# Patient Record
Sex: MALE | Race: WHITE | NOT HISPANIC OR LATINO | Employment: OTHER | URBAN - METROPOLITAN AREA
[De-identification: names, ages, dates, MRNs, and addresses within clinical notes are randomized per-mention and may not be internally consistent; named-entity substitution may affect disease eponyms.]

---

## 2023-03-08 ENCOUNTER — NURSING HOME VISIT (OUTPATIENT)
Dept: POST ACUTE CARE | Facility: EXTERNAL LOCATION | Age: 73
End: 2023-03-08
Payer: MEDICAID

## 2023-03-08 DIAGNOSIS — I10 HYPERTENSION, UNSPECIFIED TYPE: ICD-10-CM

## 2023-03-08 DIAGNOSIS — N40.1 BENIGN PROSTATIC HYPERPLASIA WITH URINARY RETENTION: ICD-10-CM

## 2023-03-08 DIAGNOSIS — F32.A DEPRESSION, UNSPECIFIED DEPRESSION TYPE: ICD-10-CM

## 2023-03-08 DIAGNOSIS — R33.8 BENIGN PROSTATIC HYPERPLASIA WITH URINARY RETENTION: ICD-10-CM

## 2023-03-08 PROCEDURE — 99309 SBSQ NF CARE MODERATE MDM 30: CPT | Performed by: INTERNAL MEDICINE

## 2023-03-08 NOTE — LETTER
Subjective  Chief complaint: Hernando Minor is a 72 y.o. male who is a long term resident patient being seen and evaluated for multiple medical problems.  Patient presents forGeneral medical care and follow-up    HPI:  Patient presents for general medical care and f/u.  Patient seen and examined at bedside.  No issues per nursing.  Patient has no acute complaints.   Denies chest pain and headache.  Denies feeling down and thoughts of harming self or others.        Review of Systems  All systems reviewed and negative except for what was mentioned in the HPI    Vital signs: 137/77, 97.6, 71, 18, 96%    Objective  Physical Exam  Constitutional:       General: He is not in acute distress.  Eyes:      Extraocular Movements: Extraocular movements intact.   Cardiovascular:      Rate and Rhythm: Regular rhythm.   Pulmonary:      Effort: Pulmonary effort is normal.      Breath sounds: Normal breath sounds.   Abdominal:      General: Bowel sounds are normal.      Palpations: Abdomen is soft.   Musculoskeletal:      Cervical back: Neck supple.      Right lower leg: No edema.      Left lower leg: No edema.   Neurological:      Mental Status: He is alert.   Psychiatric:         Mood and Affect: Mood normal.         Behavior: Behavior is cooperative.         Assessment/Plan  Problem List Items Addressed This Visit          Circulatory    HTN (hypertension)     BP at goal, continue anihypertensives  Controlled  Monitor BP            Genitourinary    BPH (benign prostatic hyperplasia)     Stable  ContinuTamsulosin            Other    Depression     Mood stable  Continue antidepressants          Medications, treatments, and labs reviewed  Continue medications and treatments as listed in [facility]    Scribe Attestation  By signing my name below, Treasure MARQUES Scribe   attest that this documentation has been prepared under the direction and in the presence of Papo Dupont MD.    Provider Attestation - Scribe documentation  All medical  record entries made by the Scribe were at my direction and personally dictated by me. I have reviewed the chart and agree that the record accurately reflects my personal performance of the history, physical exam, discussion and plan.

## 2023-03-09 PROBLEM — I10 HTN (HYPERTENSION): Status: ACTIVE | Noted: 2023-03-09

## 2023-03-09 PROBLEM — F32.A DEPRESSION: Status: ACTIVE | Noted: 2023-03-09

## 2023-03-09 PROBLEM — N40.0 BPH (BENIGN PROSTATIC HYPERPLASIA): Status: ACTIVE | Noted: 2023-03-09

## 2023-03-09 NOTE — PROGRESS NOTES
Subjective   Chief complaint: Hernando Minor is a 72 y.o. male who is a long term resident patient being seen and evaluated for multiple medical problems.  Patient presents forGeneral medical care and follow-up    HPI:  Patient presents for general medical care and f/u.  Patient seen and examined at bedside.  No issues per nursing.  Patient has no acute complaints.   Denies chest pain and headache.  Denies feeling down and thoughts of harming self or others.        Review of Systems  All systems reviewed and negative except for what was mentioned in the HPI    Vital signs: 137/77, 97.6, 71, 18, 96%    Objective   Physical Exam  Constitutional:       General: He is not in acute distress.  Eyes:      Extraocular Movements: Extraocular movements intact.   Cardiovascular:      Rate and Rhythm: Regular rhythm.   Pulmonary:      Effort: Pulmonary effort is normal.      Breath sounds: Normal breath sounds.   Abdominal:      General: Bowel sounds are normal.      Palpations: Abdomen is soft.   Musculoskeletal:      Cervical back: Neck supple.      Right lower leg: No edema.      Left lower leg: No edema.   Neurological:      Mental Status: He is alert.   Psychiatric:         Mood and Affect: Mood normal.         Behavior: Behavior is cooperative.         Assessment/Plan   Problem List Items Addressed This Visit          Circulatory    HTN (hypertension)     BP at goal, continue anihypertensives  Controlled  Monitor BP            Genitourinary    BPH (benign prostatic hyperplasia)     Stable  ContinuTamsulosin            Other    Depression     Mood stable  Continue antidepressants          Medications, treatments, and labs reviewed  Continue medications and treatments as listed in [facility]    Scribe Attestation  By signing my name below, ITreasure, Anna   attest that this documentation has been prepared under the direction and in the presence of Papo Dupont MD.    Provider Attestation - Scribe documentation  All  medical record entries made by the Scribe were at my direction and personally dictated by me. I have reviewed the chart and agree that the record accurately reflects my personal performance of the history, physical exam, discussion and plan.

## 2023-04-05 ENCOUNTER — NURSING HOME VISIT (OUTPATIENT)
Dept: POST ACUTE CARE | Facility: EXTERNAL LOCATION | Age: 73
End: 2023-04-05
Payer: MEDICAID

## 2023-04-05 DIAGNOSIS — N40.1 BENIGN PROSTATIC HYPERPLASIA WITH URINARY RETENTION: ICD-10-CM

## 2023-04-05 DIAGNOSIS — I10 HYPERTENSION, UNSPECIFIED TYPE: ICD-10-CM

## 2023-04-05 DIAGNOSIS — R33.8 BENIGN PROSTATIC HYPERPLASIA WITH URINARY RETENTION: ICD-10-CM

## 2023-04-05 DIAGNOSIS — F32.A DEPRESSION, UNSPECIFIED DEPRESSION TYPE: ICD-10-CM

## 2023-04-05 PROCEDURE — 99309 SBSQ NF CARE MODERATE MDM 30: CPT | Performed by: INTERNAL MEDICINE

## 2023-04-05 NOTE — LETTER
Patient: Hernando Minor  : 1950    Encounter Date: 2023    PROGRESS NOTE    Subjective  Chief complaint: Hernando Minor is a 72 y.o. male who is a long term care patient being seen and evaluated for monthly general medical care and follow-up.    HPI:    Patient presents for general medical care and f/u.  Patient seen and examined at bedside.  No issues per nursing.  Patient has no acute complaints.  HTN BP at goal.  Denies chest pain and headache.    BPH stable, denies difficulty voiding, urinary frequency, and weak stream.  Patient with diagnosis of depression.  Mood is stable.  Denies feeling down and thoughts of harming self or others. No acute distress.         Objective  Vital signs: 126/74, 98%    Physical Exam    Assessment/Plan  Problem List Items Addressed This Visit          Circulatory    HTN (hypertension)     BP at goal, continue anihypertensives  Controlled  Monitor BP            Genitourinary    BPH (benign prostatic hyperplasia)     Stable  ContinuTamsulosin            Other    Depression     Mood stable  Continue antidepressants          Medications, treatments, and labs reviewed  Continue medications and treatments as listed in [facility]    Scribe Attestation  By signing my name below, ITreasure Scribe   attest that this documentation has been prepared under the direction and in the presence of Papo Dupont MD.    Provider Attestation - Scribe documentation  All medical record entries made by the Scribe were at my direction and personally dictated by me. I have reviewed the chart and agree that the record accurately reflects my personal performance of the history, physical exam, discussion and plan.      Electronically Signed By: Papo Dupont MD   23  1:05 PM

## 2023-04-07 NOTE — PROGRESS NOTES
PROGRESS NOTE    Subjective   Chief complaint: Hernando Minor is a 72 y.o. male who is a long term care patient being seen and evaluated for monthly general medical care and follow-up.    HPI:    Patient presents for general medical care and f/u.  Patient seen and examined at bedside.  No issues per nursing.  Patient has no acute complaints.  HTN BP at goal.  Denies chest pain and headache.    BPH stable, denies difficulty voiding, urinary frequency, and weak stream.  Patient with diagnosis of depression.  Mood is stable.  Denies feeling down and thoughts of harming self or others. No acute distress.         Objective   Vital signs: 126/74, 98%    Physical Exam    Assessment/Plan   Problem List Items Addressed This Visit          Circulatory    HTN (hypertension)     BP at goal, continue anihypertensives  Controlled  Monitor BP            Genitourinary    BPH (benign prostatic hyperplasia)     Stable  ContinuTamsulosin            Other    Depression     Mood stable  Continue antidepressants          Medications, treatments, and labs reviewed  Continue medications and treatments as listed in [facility]    Scribe Attestation  By signing my name below, ITreasure Scribe   attest that this documentation has been prepared under the direction and in the presence of Papo Dupont MD.    Provider Attestation - Scribe documentation  All medical record entries made by the Scribe were at my direction and personally dictated by me. I have reviewed the chart and agree that the record accurately reflects my personal performance of the history, physical exam, discussion and plan.

## 2023-04-12 ENCOUNTER — NURSING HOME VISIT (OUTPATIENT)
Dept: POST ACUTE CARE | Facility: EXTERNAL LOCATION | Age: 73
End: 2023-04-12
Payer: MEDICAID

## 2023-04-12 DIAGNOSIS — I63.9 CEREBROVASCULAR ACCIDENT (CVA), UNSPECIFIED MECHANISM (MULTI): ICD-10-CM

## 2023-04-12 DIAGNOSIS — R53.1 WEAKNESS: ICD-10-CM

## 2023-04-12 PROCEDURE — 99308 SBSQ NF CARE LOW MDM 20: CPT | Performed by: INTERNAL MEDICINE

## 2023-04-12 NOTE — LETTER
Patient: Hernando Minor  : 1950    Encounter Date: 2023    PROGRESS NOTE    Subjective  Chief complaint: Hernando Minor is a 72 y.o. male who is a long term care patient being seen and evaluated for  weakness    HPI:  Patient with hx of CVA is working in therapy due to weakness. He requires SBA and minimal assistance for ADL's. No new issues or concerns at this time. No acute distress.       Objective  Vital signs:  127/76, 98%    Physical Exam  Constitutional:       General: He is not in acute distress.  Eyes:      Extraocular Movements: Extraocular movements intact.   Cardiovascular:      Rate and Rhythm: Normal rate and regular rhythm.   Pulmonary:      Effort: Pulmonary effort is normal.      Breath sounds: Normal breath sounds.   Abdominal:      General: Bowel sounds are normal.      Palpations: Abdomen is soft.   Musculoskeletal:         General: Normal range of motion.      Cervical back: Neck supple.      Right lower leg: No edema.      Left lower leg: No edema.   Neurological:      Mental Status: He is alert. Mental status is at baseline.   Psychiatric:         Mood and Affect: Mood normal.         Behavior: Behavior is cooperative.         Assessment/Plan  Problem List Items Addressed This Visit          Nervous    CVA (cerebral vascular accident) (CMS/HCC)     Monitor for weakness and changes in speech            Other    Weakness     Continue therapy          1. Weakness        2. Cerebrovascular accident (CVA), unspecified mechanism (CMS/HCC)           Medications, treatments, and labs reviewed  Continue medications and treatments as listed in [facility]    Scribe Attestation  By signing my name below, Treasure MARQUES Scribe   attest that this documentation has been prepared under the direction and in the presence of Papo Dupont MD.    Provider Attestation - Scribe documentation  All medical record entries made by the Scribe were at my direction and personally dictated by me. I have reviewed  the chart and agree that the record accurately reflects my personal performance of the history, physical exam, discussion and plan.      Electronically Signed By: Papo Dupont MD   4/17/23  3:59 PM

## 2023-04-17 PROBLEM — R53.1 WEAKNESS: Status: ACTIVE | Noted: 2023-04-17

## 2023-04-17 PROBLEM — I63.9 CVA (CEREBRAL VASCULAR ACCIDENT) (MULTI): Status: ACTIVE | Noted: 2023-04-17

## 2023-04-17 NOTE — PROGRESS NOTES
PROGRESS NOTE    Subjective   Chief complaint: Hernando Minor is a 72 y.o. male who is a long term care patient being seen and evaluated for  weakness    HPI:  Patient with hx of CVA is working in therapy due to weakness. He requires SBA and minimal assistance for ADL's. No new issues or concerns at this time. No acute distress.       Objective   Vital signs:  127/76, 98%    Physical Exam  Constitutional:       General: He is not in acute distress.  Eyes:      Extraocular Movements: Extraocular movements intact.   Cardiovascular:      Rate and Rhythm: Normal rate and regular rhythm.   Pulmonary:      Effort: Pulmonary effort is normal.      Breath sounds: Normal breath sounds.   Abdominal:      General: Bowel sounds are normal.      Palpations: Abdomen is soft.   Musculoskeletal:         General: Normal range of motion.      Cervical back: Neck supple.      Right lower leg: No edema.      Left lower leg: No edema.   Neurological:      Mental Status: He is alert. Mental status is at baseline.   Psychiatric:         Mood and Affect: Mood normal.         Behavior: Behavior is cooperative.         Assessment/Plan   Problem List Items Addressed This Visit          Nervous    CVA (cerebral vascular accident) (CMS/HCC)     Monitor for weakness and changes in speech            Other    Weakness     Continue therapy          1. Weakness        2. Cerebrovascular accident (CVA), unspecified mechanism (CMS/HCC)           Medications, treatments, and labs reviewed  Continue medications and treatments as listed in [facility]    Scribe Attestation  By signing my name below, I, Anna Mejia   attest that this documentation has been prepared under the direction and in the presence of Papo Dupont MD.    Provider Attestation - Scribe documentation  All medical record entries made by the Scribe were at my direction and personally dictated by me. I have reviewed the chart and agree that the record accurately reflects my personal  performance of the history, physical exam, discussion and plan.

## 2023-05-03 ENCOUNTER — NURSING HOME VISIT (OUTPATIENT)
Dept: POST ACUTE CARE | Facility: EXTERNAL LOCATION | Age: 73
End: 2023-05-03
Payer: MEDICAID

## 2023-05-03 DIAGNOSIS — N40.1 BENIGN PROSTATIC HYPERPLASIA WITH URINARY RETENTION: ICD-10-CM

## 2023-05-03 DIAGNOSIS — R33.8 BENIGN PROSTATIC HYPERPLASIA WITH URINARY RETENTION: ICD-10-CM

## 2023-05-03 DIAGNOSIS — I10 HYPERTENSION, UNSPECIFIED TYPE: ICD-10-CM

## 2023-05-03 DIAGNOSIS — F32.A DEPRESSION, UNSPECIFIED DEPRESSION TYPE: ICD-10-CM

## 2023-05-03 PROCEDURE — 99309 SBSQ NF CARE MODERATE MDM 30: CPT | Performed by: INTERNAL MEDICINE

## 2023-05-03 NOTE — LETTER
Patient: Hernando Minor  : 1950    Encounter Date: 2023    PROGRESS NOTE    Subjective  Chief complaint: Hernando Minor is a 72 y.o. male who is a long term care patient being seen and evaluated for monthly general medical care and follow-up.    HPI:    Patient presents for general medical care and f/u.  Patient seen and examined at bedside.  No issues per nursing.  Patient has no acute complaints.  HTN BP at goal.  Denies chest pain and headache.    BPH stable, denies difficulty voiding, urinary frequency, and weak stream.  Patient with diagnosis of depression.  Mood is stable.  Denies feeling down and thoughts of harming self or others. No acute distress.         Objective  Vital signs: 128/74, 97%    Physical Exam  Constitutional:       General: He is not in acute distress.  Eyes:      Extraocular Movements: Extraocular movements intact.   Cardiovascular:      Rate and Rhythm: Normal rate and regular rhythm.   Pulmonary:      Effort: Pulmonary effort is normal.      Breath sounds: Normal breath sounds.   Abdominal:      General: Bowel sounds are normal.      Palpations: Abdomen is soft.   Musculoskeletal:      Cervical back: Neck supple.      Right lower leg: No edema.      Left lower leg: No edema.   Neurological:      Mental Status: He is alert.   Psychiatric:         Mood and Affect: Mood normal.         Behavior: Behavior is cooperative.         Assessment/Plan  Problem List Items Addressed This Visit          Circulatory    HTN (hypertension)     BP at goal, continue anihypertensives  Controlled  Monitor BP            Genitourinary    BPH (benign prostatic hyperplasia)     Stable  ContinuTamsulosin            Other    Depression     Mood stable  Continue antidepressants        Medications, treatments, and labs reviewed  Continue medications and treatments as listed in [facility]    Scribe Attestation  By signing my name below, I, Treasure Byrnes, Gordoniblaura   attest that this documentation has been prepared  under the direction and in the presence of Papo Dupont MD.    Provider Attestation - Scribe documentation  All medical record entries made by the Scribe were at my direction and personally dictated by me. I have reviewed the chart and agree that the record accurately reflects my personal performance of the history, physical exam, discussion and plan.      Electronically Signed By: Papo Dupont MD   5/4/23  6:23 PM

## 2023-05-04 NOTE — PROGRESS NOTES
PROGRESS NOTE    Subjective   Chief complaint: Hernando Minor is a 72 y.o. male who is a long term care patient being seen and evaluated for monthly general medical care and follow-up.    HPI:    Patient presents for general medical care and f/u.  Patient seen and examined at bedside.  No issues per nursing.  Patient has no acute complaints.  HTN BP at goal.  Denies chest pain and headache.    BPH stable, denies difficulty voiding, urinary frequency, and weak stream.  Patient with diagnosis of depression.  Mood is stable.  Denies feeling down and thoughts of harming self or others. No acute distress.         Objective   Vital signs: 128/74, 97%    Physical Exam  Constitutional:       General: He is not in acute distress.  Eyes:      Extraocular Movements: Extraocular movements intact.   Cardiovascular:      Rate and Rhythm: Normal rate and regular rhythm.   Pulmonary:      Effort: Pulmonary effort is normal.      Breath sounds: Normal breath sounds.   Abdominal:      General: Bowel sounds are normal.      Palpations: Abdomen is soft.   Musculoskeletal:      Cervical back: Neck supple.      Right lower leg: No edema.      Left lower leg: No edema.   Neurological:      Mental Status: He is alert.   Psychiatric:         Mood and Affect: Mood normal.         Behavior: Behavior is cooperative.         Assessment/Plan   Problem List Items Addressed This Visit          Circulatory    HTN (hypertension)     BP at goal, continue anihypertensives  Controlled  Monitor BP            Genitourinary    BPH (benign prostatic hyperplasia)     Stable  ContinuTamsulosin            Other    Depression     Mood stable  Continue antidepressants        Medications, treatments, and labs reviewed  Continue medications and treatments as listed in [facility]    Scribe Attestation  By signing my name below, Treasure MARQUES, Scribe   attest that this documentation has been prepared under the direction and in the presence of Papo Dupont  MD.    Provider Attestation - Scribe documentation  All medical record entries made by the Scribe were at my direction and personally dictated by me. I have reviewed the chart and agree that the record accurately reflects my personal performance of the history, physical exam, discussion and plan.

## 2023-06-07 ENCOUNTER — NURSING HOME VISIT (OUTPATIENT)
Dept: POST ACUTE CARE | Facility: EXTERNAL LOCATION | Age: 73
End: 2023-06-07
Payer: MEDICAID

## 2023-06-07 DIAGNOSIS — F32.A DEPRESSION, UNSPECIFIED DEPRESSION TYPE: ICD-10-CM

## 2023-06-07 DIAGNOSIS — I10 HYPERTENSION, UNSPECIFIED TYPE: ICD-10-CM

## 2023-06-07 DIAGNOSIS — R33.8 BENIGN PROSTATIC HYPERPLASIA WITH URINARY RETENTION: ICD-10-CM

## 2023-06-07 DIAGNOSIS — N40.1 BENIGN PROSTATIC HYPERPLASIA WITH URINARY RETENTION: ICD-10-CM

## 2023-06-07 PROCEDURE — 99309 SBSQ NF CARE MODERATE MDM 30: CPT | Performed by: INTERNAL MEDICINE

## 2023-06-07 NOTE — LETTER
Patient: Hernando Minor  : 1950    Encounter Date: 2023    PROGRESS NOTE    Subjective  Chief complaint: Hernando Minor is a 72 y.o. male who is a long term care patient being seen and evaluated for monthly general medical care and follow-up.    HPI:    Patient presents for general medical care and f/u.  Patient seen and examined at bedside.  No issues per nursing.  Patient has no acute complaints.  HTN BP at goal.  Denies chest pain and headache.    BPH stable, denies difficulty voiding, urinary frequency, and weak stream.  Patient with diagnosis of depression.  Mood is stable.  Denies feeling down and thoughts of harming self or others. No acute distress.         Objective  Vital signs: 130/74, 97%    Physical Exam  Constitutional:       General: He is not in acute distress.  Eyes:      Extraocular Movements: Extraocular movements intact.   Cardiovascular:      Rate and Rhythm: Normal rate and regular rhythm.   Pulmonary:      Effort: Pulmonary effort is normal.      Breath sounds: Normal breath sounds.   Abdominal:      General: Bowel sounds are normal.      Palpations: Abdomen is soft.   Musculoskeletal:      Cervical back: Neck supple.      Right lower leg: No edema.      Left lower leg: No edema.   Neurological:      Mental Status: He is alert.   Psychiatric:         Mood and Affect: Mood normal.         Behavior: Behavior is cooperative.         Assessment/Plan  Problem List Items Addressed This Visit          Circulatory    HTN (hypertension)     BP at goal, continue anihypertensives  Controlled  Monitor BP            Genitourinary    BPH (benign prostatic hyperplasia)     Stable  Continue Tamsulosin            Other    Depression     Mood stable  Continue antidepressants        Medications, treatments, and labs reviewed  Continue medications and treatments as listed in [facility]    Scribe Attestation  By signing my name below, I, Treasure Byrnes, Anna   attest that this documentation has been prepared  under the direction and in the presence of Papo Dupont MD.    Provider Attestation - Scribe documentation  All medical record entries made by the Scribe were at my direction and personally dictated by me. I have reviewed the chart and agree that the record accurately reflects my personal performance of the history, physical exam, discussion and plan.      Electronically Signed By: Papo Dupont MD   6/10/23  2:17 PM

## 2023-06-09 NOTE — PROGRESS NOTES
PROGRESS NOTE    Subjective   Chief complaint: Hernando Minor is a 72 y.o. male who is a long term care patient being seen and evaluated for monthly general medical care and follow-up.    HPI:    Patient presents for general medical care and f/u.  Patient seen and examined at bedside.  No issues per nursing.  Patient has no acute complaints.  HTN BP at goal.  Denies chest pain and headache.    BPH stable, denies difficulty voiding, urinary frequency, and weak stream.  Patient with diagnosis of depression.  Mood is stable.  Denies feeling down and thoughts of harming self or others. No acute distress.         Objective   Vital signs: 130/74, 97%    Physical Exam  Constitutional:       General: He is not in acute distress.  Eyes:      Extraocular Movements: Extraocular movements intact.   Cardiovascular:      Rate and Rhythm: Normal rate and regular rhythm.   Pulmonary:      Effort: Pulmonary effort is normal.      Breath sounds: Normal breath sounds.   Abdominal:      General: Bowel sounds are normal.      Palpations: Abdomen is soft.   Musculoskeletal:      Cervical back: Neck supple.      Right lower leg: No edema.      Left lower leg: No edema.   Neurological:      Mental Status: He is alert.   Psychiatric:         Mood and Affect: Mood normal.         Behavior: Behavior is cooperative.         Assessment/Plan   Problem List Items Addressed This Visit          Circulatory    HTN (hypertension)     BP at goal, continue anihypertensives  Controlled  Monitor BP            Genitourinary    BPH (benign prostatic hyperplasia)     Stable  Continue Tamsulosin            Other    Depression     Mood stable  Continue antidepressants        Medications, treatments, and labs reviewed  Continue medications and treatments as listed in [facility]    Scribe Attestation  By signing my name below, Treasure MARQUES, Scribe   attest that this documentation has been prepared under the direction and in the presence of Papo Dupont  MD.    Provider Attestation - Scribe documentation  All medical record entries made by the Scribe were at my direction and personally dictated by me. I have reviewed the chart and agree that the record accurately reflects my personal performance of the history, physical exam, discussion and plan.

## 2023-06-14 ENCOUNTER — NURSING HOME VISIT (OUTPATIENT)
Dept: POST ACUTE CARE | Facility: EXTERNAL LOCATION | Age: 73
End: 2023-06-14
Payer: MEDICAID

## 2023-06-14 DIAGNOSIS — F32.A DEPRESSION, UNSPECIFIED DEPRESSION TYPE: ICD-10-CM

## 2023-06-14 PROCEDURE — 99308 SBSQ NF CARE LOW MDM 20: CPT | Performed by: INTERNAL MEDICINE

## 2023-06-14 NOTE — LETTER
Patient: Hernando Minor  : 1950    Encounter Date: 2023    PROGRESS NOTE    Subjective  Chief complaint: Hernando Minor is a 72 y.o. male who is a long term care patient being seen and evaluated for depression    HPI:  I was asked to see patient and evaluate continued use of Zoloft. Patient with hx of depression, denies feeling down or thoughts of harming self or others. Patient fell down landed on the metal part of the wheelchair complaint of back pain x-ray was negative she was given pain medication and she felt better.       Objective  Vital signs: 129/74, 98%    Physical Exam  Constitutional:       General: He is not in acute distress.  Eyes:      Extraocular Movements: Extraocular movements intact.   Cardiovascular:      Rate and Rhythm: Normal rate and regular rhythm.   Pulmonary:      Effort: Pulmonary effort is normal.      Breath sounds: Normal breath sounds.   Abdominal:      General: Bowel sounds are normal.      Palpations: Abdomen is soft.   Musculoskeletal:      Cervical back: Neck supple.      Right lower leg: Edema present.      Left lower leg: Edema present.      Comments: Tender lower back   Neurological:      Mental Status: He is alert.   Psychiatric:         Mood and Affect: Mood normal.         Behavior: Behavior is cooperative.         Assessment/Plan  Problem List Items Addressed This Visit          Other    Depression     Mood stable  Decrease Zoloft to 100mg at HS for gradual dose reduction trail  Continue to monitor mood and behaviors          Other Visit Diagnoses       Fall, initial encounter    -  Primary    Acute low back pain with sciatica, sciatica laterality unspecified, unspecified back pain laterality              Medications, treatments, and labs reviewed  Continue medications and treatments as listed in [facility]    Scribe Attestation  By signing my name below, Treasure MARQUES, Anna   attest that this documentation has been prepared under the direction and in the presence  of Papo Dupont MD.    Provider Attestation - Scribe documentation  All medical record entries made by the Scribe were at my direction and personally dictated by me. I have reviewed the chart and agree that the record accurately reflects my personal performance of the history, physical exam, discussion and plan.    1. Fall, initial encounter        2. Depression, unspecified depression type        3. Acute low back pain with sciatica, sciatica laterality unspecified, unspecified back pain laterality               Electronically Signed By: Papo Dupont MD   6/16/23  4:54 PM

## 2023-06-16 NOTE — PROGRESS NOTES
PROGRESS NOTE    Subjective   Chief complaint: Hernando Minor is a 72 y.o. male who is a long term care patient being seen and evaluated for depression    HPI:  I was asked to see patient and evaluate continued use of Zoloft. Patient with hx of depression, denies feeling down or thoughts of harming self or others. No acute distress.       Objective   Vital signs: 129/74, 98%    Physical Exam  Constitutional:       General: He is not in acute distress.  Eyes:      Extraocular Movements: Extraocular movements intact.   Cardiovascular:      Rate and Rhythm: Normal rate and regular rhythm.   Pulmonary:      Effort: Pulmonary effort is normal.      Breath sounds: Normal breath sounds.   Abdominal:      General: Bowel sounds are normal.      Palpations: Abdomen is soft.   Musculoskeletal:      Cervical back: Neck supple.      Right lower leg: Edema present.      Left lower leg: Edema present.   Neurological:      Mental Status: He is alert.   Psychiatric:         Mood and Affect: Mood normal.         Behavior: Behavior is cooperative.         Assessment/Plan   Problem List Items Addressed This Visit          Other    Depression     Mood stable  Decrease Zoloft to 100mg at HS for gradual dose reduction trail  Continue to monitor mood and behaviors          Medications, treatments, and labs reviewed  Continue medications and treatments as listed in [facility]    Scribe Attestation  By signing my name below, ITreasure Scribe   attest that this documentation has been prepared under the direction and in the presence of Papo Dupont MD.    Provider Attestation - Scribe documentation  All medical record entries made by the Scribe were at my direction and personally dictated by me. I have reviewed the chart and agree that the record accurately reflects my personal performance of the history, physical exam, discussion and plan.    1. Depression, unspecified depression type

## 2023-06-16 NOTE — ASSESSMENT & PLAN NOTE
Mood stable  Decrease Zoloft to 100mg at HS for gradual dose reduction trail  Continue to monitor mood and behaviors

## 2023-07-05 ENCOUNTER — NURSING HOME VISIT (OUTPATIENT)
Dept: POST ACUTE CARE | Facility: EXTERNAL LOCATION | Age: 73
End: 2023-07-05
Payer: MEDICAID

## 2023-07-05 DIAGNOSIS — I10 HYPERTENSION, UNSPECIFIED TYPE: ICD-10-CM

## 2023-07-05 DIAGNOSIS — R33.8 BENIGN PROSTATIC HYPERPLASIA WITH URINARY RETENTION: ICD-10-CM

## 2023-07-05 DIAGNOSIS — F32.A DEPRESSION, UNSPECIFIED DEPRESSION TYPE: ICD-10-CM

## 2023-07-05 DIAGNOSIS — N40.1 BENIGN PROSTATIC HYPERPLASIA WITH URINARY RETENTION: ICD-10-CM

## 2023-07-05 PROCEDURE — 99309 SBSQ NF CARE MODERATE MDM 30: CPT | Performed by: INTERNAL MEDICINE

## 2023-07-05 NOTE — PROGRESS NOTES
PROGRESS NOTE    Subjective   Chief complaint: Hernando Minor is a 72 y.o. male who is a long term care patient being seen and evaluated for monthly general medical care and follow-up.    HPI:    Patient presents for general medical care and f/u.  Patient seen and examined at bedside.  No issues per nursing.  Patient has no acute complaints.  HTN BP at goal.  Denies chest pain and headache.    BPH stable, denies difficulty voiding, urinary frequency, and weak stream.  Patient with diagnosis of depression.  Mood is stable.  Denies feeling down and thoughts of harming self or others. No acute distress.         Objective   Vital signs: 128/76, 97%    Physical Exam  Constitutional:       General: He is not in acute distress.  Eyes:      Extraocular Movements: Extraocular movements intact.   Cardiovascular:      Rate and Rhythm: Normal rate and regular rhythm.   Pulmonary:      Effort: Pulmonary effort is normal.      Breath sounds: Normal breath sounds.   Abdominal:      General: Bowel sounds are normal.      Palpations: Abdomen is soft.   Musculoskeletal:      Cervical back: Neck supple.      Right lower leg: No edema.      Left lower leg: No edema.   Neurological:      Mental Status: He is alert.   Psychiatric:         Mood and Affect: Mood normal.         Behavior: Behavior is cooperative.         Assessment/Plan   Problem List Items Addressed This Visit          Cardiac and Vasculature    HTN (hypertension)     BP at goal, continue anihypertensives  Controlled  Monitor BP            Genitourinary and Reproductive    BPH (benign prostatic hyperplasia)     Stable  Continue Tamsulosin            Mental Health    Depression     Mood stable  Continue to monitor mood and behaviors        Medications, treatments, and labs reviewed  Continue medications and treatments as listed in [facility]    Scribe Attestation  By signing my name below, ITreasure, Anna   attest that this documentation has been prepared under the  direction and in the presence of Papo Dupont MD.    Provider Attestation - Scribe documentation  All medical record entries made by the Scribe were at my direction and personally dictated by me. I have reviewed the chart and agree that the record accurately reflects my personal performance of the history, physical exam, discussion and plan.  1. Benign prostatic hyperplasia with urinary retention        2. Depression, unspecified depression type        3. Hypertension, unspecified type

## 2023-07-05 NOTE — LETTER
Patient: Hernando Minor  : 1950    Encounter Date: 2023    PROGRESS NOTE    Subjective  Chief complaint: Hernando Minor is a 72 y.o. male who is a long term care patient being seen and evaluated for monthly general medical care and follow-up.    HPI:    Patient presents for general medical care and f/u.  Patient seen and examined at bedside.  No issues per nursing.  Patient has no acute complaints.  HTN BP at goal.  Denies chest pain and headache.    BPH stable, denies difficulty voiding, urinary frequency, and weak stream.  Patient with diagnosis of depression.  Mood is stable.  Denies feeling down and thoughts of harming self or others. No acute distress.         Objective  Vital signs: 128/76, 97%    Physical Exam  Constitutional:       General: He is not in acute distress.  Eyes:      Extraocular Movements: Extraocular movements intact.   Cardiovascular:      Rate and Rhythm: Normal rate and regular rhythm.   Pulmonary:      Effort: Pulmonary effort is normal.      Breath sounds: Normal breath sounds.   Abdominal:      General: Bowel sounds are normal.      Palpations: Abdomen is soft.   Musculoskeletal:      Cervical back: Neck supple.      Right lower leg: No edema.      Left lower leg: No edema.   Neurological:      Mental Status: He is alert.   Psychiatric:         Mood and Affect: Mood normal.         Behavior: Behavior is cooperative.         Assessment/Plan  Problem List Items Addressed This Visit          Cardiac and Vasculature    HTN (hypertension)     BP at goal, continue anihypertensives  Controlled  Monitor BP            Genitourinary and Reproductive    BPH (benign prostatic hyperplasia)     Stable  Continue Tamsulosin            Mental Health    Depression     Mood stable  Continue to monitor mood and behaviors        Medications, treatments, and labs reviewed  Continue medications and treatments as listed in [facility]    Scribe Attestation  By signing my name below, I, Anna Mejia    attest that this documentation has been prepared under the direction and in the presence of Papo Dupont MD.    Provider Attestation - Scribe documentation  All medical record entries made by the Scribe were at my direction and personally dictated by me. I have reviewed the chart and agree that the record accurately reflects my personal performance of the history, physical exam, discussion and plan.  1. Benign prostatic hyperplasia with urinary retention        2. Depression, unspecified depression type        3. Hypertension, unspecified type              Electronically Signed By: Papo Dupont MD   7/5/23  6:07 PM

## 2023-08-02 ENCOUNTER — NURSING HOME VISIT (OUTPATIENT)
Dept: POST ACUTE CARE | Facility: EXTERNAL LOCATION | Age: 73
End: 2023-08-02
Payer: MEDICAID

## 2023-08-02 DIAGNOSIS — R33.8 BENIGN PROSTATIC HYPERPLASIA WITH URINARY RETENTION: Primary | ICD-10-CM

## 2023-08-02 DIAGNOSIS — N40.1 BENIGN PROSTATIC HYPERPLASIA WITH URINARY RETENTION: Primary | ICD-10-CM

## 2023-08-02 DIAGNOSIS — F32.A DEPRESSION, UNSPECIFIED DEPRESSION TYPE: ICD-10-CM

## 2023-08-02 DIAGNOSIS — I10 HYPERTENSION, UNSPECIFIED TYPE: ICD-10-CM

## 2023-08-02 PROCEDURE — 99309 SBSQ NF CARE MODERATE MDM 30: CPT | Performed by: INTERNAL MEDICINE

## 2023-08-02 NOTE — LETTER
Patient: Hernando iMnor  : 1950    Encounter Date: 2023    PROGRESS NOTE    Subjective  Chief complaint: Hernando Minor is a 72 y.o. male who is a long term care patient being seen and evaluated for monthly general medical care and follow-up.    HPI:    Patient presents for general medical care and f/u.  Patient seen and examined at bedside.  No issues per nursing.  Patient has no acute complaints.  HTN BP at goal.  Denies chest pain and headache.    BPH stable, denies difficulty voiding, urinary frequency, and weak stream.  Patient with diagnosis of depression.  Mood is stable.  Denies feeling down and thoughts of harming self or others. No acute distress.         Objective  Vital signs: 128/76, 97%    Physical Exam  Constitutional:       General: He is not in acute distress.  Eyes:      Extraocular Movements: Extraocular movements intact.   Cardiovascular:      Rate and Rhythm: Normal rate and regular rhythm.   Pulmonary:      Effort: Pulmonary effort is normal.      Breath sounds: Normal breath sounds.   Abdominal:      General: Bowel sounds are normal.      Palpations: Abdomen is soft.   Musculoskeletal:      Cervical back: Neck supple.      Right lower leg: No edema.      Left lower leg: No edema.   Neurological:      Mental Status: He is alert.   Psychiatric:         Mood and Affect: Mood normal.         Behavior: Behavior is cooperative.         Assessment/Plan  Problem List Items Addressed This Visit       HTN (hypertension)     BP at goal, continue anihypertensives  Controlled  Monitor BP         Depression     Mood stable  Continue to monitor mood and behaviors         BPH (benign prostatic hyperplasia) - Primary     Stable  Continue Tamsulosin        Medications, treatments, and labs reviewed  Continue medications and treatments as listed in [facility]    Scribe Attestation  By signing my name below, I, Treasure Byrnes, Anna   attest that this documentation has been prepared under the direction and in  the presence of Papo Dupont MD.    Provider Attestation - Scribe documentation  All medical record entries made by the Scribe were at my direction and personally dictated by me. I have reviewed the chart and agree that the record accurately reflects my personal performance of the history, physical exam, discussion and plan.  1. Benign prostatic hyperplasia with urinary retention        2. Depression, unspecified depression type        3. Hypertension, unspecified type              Electronically Signed By: Papo Dupont MD   8/3/23 11:45 AM

## 2023-08-03 NOTE — PROGRESS NOTES
PROGRESS NOTE    Subjective   Chief complaint: Hernando Minor is a 72 y.o. male who is a long term care patient being seen and evaluated for monthly general medical care and follow-up.    HPI:    Patient presents for general medical care and f/u.  Patient seen and examined at bedside.  No issues per nursing.  Patient has no acute complaints.  HTN BP at goal.  Denies chest pain and headache.    BPH stable, denies difficulty voiding, urinary frequency, and weak stream.  Patient with diagnosis of depression.  Mood is stable.  Denies feeling down and thoughts of harming self or others. No acute distress.         Objective   Vital signs: 128/76, 97%    Physical Exam  Constitutional:       General: He is not in acute distress.  Eyes:      Extraocular Movements: Extraocular movements intact.   Cardiovascular:      Rate and Rhythm: Normal rate and regular rhythm.   Pulmonary:      Effort: Pulmonary effort is normal.      Breath sounds: Normal breath sounds.   Abdominal:      General: Bowel sounds are normal.      Palpations: Abdomen is soft.   Musculoskeletal:      Cervical back: Neck supple.      Right lower leg: No edema.      Left lower leg: No edema.   Neurological:      Mental Status: He is alert.   Psychiatric:         Mood and Affect: Mood normal.         Behavior: Behavior is cooperative.         Assessment/Plan   Problem List Items Addressed This Visit       HTN (hypertension)     BP at goal, continue anihypertensives  Controlled  Monitor BP         Depression     Mood stable  Continue to monitor mood and behaviors         BPH (benign prostatic hyperplasia) - Primary     Stable  Continue Tamsulosin        Medications, treatments, and labs reviewed  Continue medications and treatments as listed in [facility]    Scribe Attestation  By signing my name below, Treasure MARQUES Scribe   attest that this documentation has been prepared under the direction and in the presence of Papo Dupont MD.    Provider Attestation - Anna  documentation  All medical record entries made by the Scribe were at my direction and personally dictated by me. I have reviewed the chart and agree that the record accurately reflects my personal performance of the history, physical exam, discussion and plan.  1. Benign prostatic hyperplasia with urinary retention        2. Depression, unspecified depression type        3. Hypertension, unspecified type

## 2023-09-06 ENCOUNTER — NURSING HOME VISIT (OUTPATIENT)
Dept: POST ACUTE CARE | Facility: EXTERNAL LOCATION | Age: 73
End: 2023-09-06
Payer: MEDICAID

## 2023-09-06 DIAGNOSIS — I10 HYPERTENSION, UNSPECIFIED TYPE: Primary | ICD-10-CM

## 2023-09-06 DIAGNOSIS — N40.1 BENIGN PROSTATIC HYPERPLASIA WITH URINARY RETENTION: ICD-10-CM

## 2023-09-06 DIAGNOSIS — R33.8 BENIGN PROSTATIC HYPERPLASIA WITH URINARY RETENTION: ICD-10-CM

## 2023-09-06 DIAGNOSIS — F32.A DEPRESSION, UNSPECIFIED DEPRESSION TYPE: ICD-10-CM

## 2023-09-06 PROCEDURE — 99309 SBSQ NF CARE MODERATE MDM 30: CPT | Performed by: INTERNAL MEDICINE

## 2023-09-06 NOTE — LETTER
Patient: Hernando Minor  : 1950    Encounter Date: 2023    PROGRESS NOTE    Subjective  Chief complaint: Hernando Minor is a 72 y.o. male who is a long term care patient being seen and evaluated for monthly general medical care and follow-up.    HPI:    Patient presents for general medical care and f/u.  Patient seen and examined at bedside.  No issues per nursing.  Patient has no acute complaints.  HTN BP at goal.  Denies chest pain and headache.    BPH stable, denies difficulty voiding, urinary frequency, and weak stream.  Patient with diagnosis of depression.  Mood is stable.  Denies feeling down and thoughts of harming self or others. No acute distress.         Objective  Vital signs: 129/74, 97%    Physical Exam  Constitutional:       General: He is not in acute distress.  Eyes:      Extraocular Movements: Extraocular movements intact.   Cardiovascular:      Rate and Rhythm: Normal rate and regular rhythm.   Pulmonary:      Effort: Pulmonary effort is normal.      Breath sounds: Normal breath sounds.   Abdominal:      General: Bowel sounds are normal.      Palpations: Abdomen is soft.   Musculoskeletal:      Cervical back: Neck supple.      Right lower leg: No edema.      Left lower leg: No edema.   Neurological:      Mental Status: He is alert.   Psychiatric:         Mood and Affect: Mood normal.         Behavior: Behavior is cooperative.         Assessment/Plan  Problem List Items Addressed This Visit       HTN (hypertension) - Primary     BP at goal, continue anihypertensives  Controlled  Monitor BP         Depression     Mood stable  Continue to monitor mood and behaviors         BPH (benign prostatic hyperplasia)     Stable  Continue Tamsulosin        Medications, treatments, and labs reviewed  Continue medications and treatments as listed in [facility]    Scribe Attestation  By signing my name below, I, Treasure Byrnes, Anna   attest that this documentation has been prepared under the direction and in  the presence of Papo Dupont MD.    Provider Attestation - Scribe documentation  All medical record entries made by the Scribe were at my direction and personally dictated by me. I have reviewed the chart and agree that the record accurately reflects my personal performance of the history, physical exam, discussion and plan.  1. Hypertension, unspecified type        2. Depression, unspecified depression type        3. Benign prostatic hyperplasia with urinary retention              Electronically Signed By: Papo Dupont MD   9/7/23  3:23 PM

## 2023-09-07 NOTE — PROGRESS NOTES
PROGRESS NOTE    Subjective   Chief complaint: Hernando Minor is a 72 y.o. male who is a long term care patient being seen and evaluated for monthly general medical care and follow-up.    HPI:    Patient presents for general medical care and f/u.  Patient seen and examined at bedside.  No issues per nursing.  Patient has no acute complaints.  HTN BP at goal.  Denies chest pain and headache.    BPH stable, denies difficulty voiding, urinary frequency, and weak stream.  Patient with diagnosis of depression.  Mood is stable.  Denies feeling down and thoughts of harming self or others. No acute distress.         Objective   Vital signs: 129/74, 97%    Physical Exam  Constitutional:       General: He is not in acute distress.  Eyes:      Extraocular Movements: Extraocular movements intact.   Cardiovascular:      Rate and Rhythm: Normal rate and regular rhythm.   Pulmonary:      Effort: Pulmonary effort is normal.      Breath sounds: Normal breath sounds.   Abdominal:      General: Bowel sounds are normal.      Palpations: Abdomen is soft.   Musculoskeletal:      Cervical back: Neck supple.      Right lower leg: No edema.      Left lower leg: No edema.   Neurological:      Mental Status: He is alert.   Psychiatric:         Mood and Affect: Mood normal.         Behavior: Behavior is cooperative.         Assessment/Plan   Problem List Items Addressed This Visit       HTN (hypertension) - Primary     BP at goal, continue anihypertensives  Controlled  Monitor BP         Depression     Mood stable  Continue to monitor mood and behaviors         BPH (benign prostatic hyperplasia)     Stable  Continue Tamsulosin        Medications, treatments, and labs reviewed  Continue medications and treatments as listed in [facility]    Scribe Attestation  By signing my name below, Treasure MARQUES Scribe   attest that this documentation has been prepared under the direction and in the presence of Papo Dupont MD.    Provider Attestation - Gordonibe  documentation  All medical record entries made by the Scribe were at my direction and personally dictated by me. I have reviewed the chart and agree that the record accurately reflects my personal performance of the history, physical exam, discussion and plan.  1. Hypertension, unspecified type        2. Depression, unspecified depression type        3. Benign prostatic hyperplasia with urinary retention

## 2023-10-04 ENCOUNTER — NURSING HOME VISIT (OUTPATIENT)
Dept: POST ACUTE CARE | Facility: EXTERNAL LOCATION | Age: 73
End: 2023-10-04
Payer: MEDICAID

## 2023-10-04 DIAGNOSIS — N40.1 BENIGN PROSTATIC HYPERPLASIA WITH URINARY RETENTION: ICD-10-CM

## 2023-10-04 DIAGNOSIS — I10 HYPERTENSION, UNSPECIFIED TYPE: Primary | ICD-10-CM

## 2023-10-04 DIAGNOSIS — F32.A DEPRESSION, UNSPECIFIED DEPRESSION TYPE: ICD-10-CM

## 2023-10-04 DIAGNOSIS — R33.8 BENIGN PROSTATIC HYPERPLASIA WITH URINARY RETENTION: ICD-10-CM

## 2023-10-04 PROCEDURE — 99309 SBSQ NF CARE MODERATE MDM 30: CPT | Performed by: INTERNAL MEDICINE

## 2023-10-04 NOTE — LETTER
Patient: Hernando Minor  : 1950    Encounter Date: 10/04/2023    PROGRESS NOTE    Subjective  Chief complaint: Hernando Minor is a 73 y.o. male who is a long term care patient being seen and evaluated for monthly general medical care and follow-up.    HPI:    Patient presents for general medical care and f/u.  Patient seen and examined at bedside.  No issues per nursing.  Patient has no acute complaints.  HTN BP at goal.  Denies chest pain and headache.    BPH stable, denies difficulty voiding, urinary frequency, and weak stream.  Patient with diagnosis of depression.  Mood is stable.  Denies feeling down and thoughts of harming self or others. No acute distress.         Objective  Vital signs: 124/72, 97%    Physical Exam  Constitutional:       General: He is not in acute distress.  Eyes:      Extraocular Movements: Extraocular movements intact.   Cardiovascular:      Rate and Rhythm: Normal rate and regular rhythm.   Pulmonary:      Effort: Pulmonary effort is normal.      Breath sounds: Normal breath sounds.   Abdominal:      General: Bowel sounds are normal.      Palpations: Abdomen is soft.   Musculoskeletal:      Cervical back: Neck supple.      Right lower leg: No edema.      Left lower leg: No edema.   Neurological:      Mental Status: He is alert.   Psychiatric:         Mood and Affect: Mood normal.         Behavior: Behavior is cooperative.         Assessment/Plan  Problem List Items Addressed This Visit       HTN (hypertension) - Primary     BP at goal, continue anihypertensives  Controlled  Monitor BP         Depression     Mood stable  Continue to monitor mood and behaviors         BPH (benign prostatic hyperplasia)     Stable  Continue Tamsulosin        Medications, treatments, and labs reviewed  Continue medications and treatments as listed in [facility]    Scribe Attestation  By signing my name below, I, Treasure Byrnes, Anna   attest that this documentation has been prepared under the direction and in  the presence of Papo Dupont MD.    Provider Attestation - Scribe documentation  All medical record entries made by the Scribe were at my direction and personally dictated by me. I have reviewed the chart and agree that the record accurately reflects my personal performance of the history, physical exam, discussion and plan.  1. Hypertension, unspecified type        2. Benign prostatic hyperplasia with urinary retention        3. Depression, unspecified depression type              Electronically Signed By: Papo Dupont MD   10/4/23  3:32 PM

## 2023-10-04 NOTE — PROGRESS NOTES
PROGRESS NOTE    Subjective   Chief complaint: Heranndo Minor is a 73 y.o. male who is a long term care patient being seen and evaluated for monthly general medical care and follow-up.    HPI:    Patient presents for general medical care and f/u.  Patient seen and examined at bedside.  No issues per nursing.  Patient has no acute complaints.  HTN BP at goal.  Denies chest pain and headache.    BPH stable, denies difficulty voiding, urinary frequency, and weak stream.  Patient with diagnosis of depression.  Mood is stable.  Denies feeling down and thoughts of harming self or others. No acute distress.         Objective   Vital signs: 124/72, 97%    Physical Exam  Constitutional:       General: He is not in acute distress.  Eyes:      Extraocular Movements: Extraocular movements intact.   Cardiovascular:      Rate and Rhythm: Normal rate and regular rhythm.   Pulmonary:      Effort: Pulmonary effort is normal.      Breath sounds: Normal breath sounds.   Abdominal:      General: Bowel sounds are normal.      Palpations: Abdomen is soft.   Musculoskeletal:      Cervical back: Neck supple.      Right lower leg: No edema.      Left lower leg: No edema.   Neurological:      Mental Status: He is alert.   Psychiatric:         Mood and Affect: Mood normal.         Behavior: Behavior is cooperative.         Assessment/Plan   Problem List Items Addressed This Visit       HTN (hypertension) - Primary     BP at goal, continue anihypertensives  Controlled  Monitor BP         Depression     Mood stable  Continue to monitor mood and behaviors         BPH (benign prostatic hyperplasia)     Stable  Continue Tamsulosin        Medications, treatments, and labs reviewed  Continue medications and treatments as listed in [facility]    Scribe Attestation  By signing my name below, Treasure MARQUES Scribe   attest that this documentation has been prepared under the direction and in the presence of Papo Dupont MD.    Provider Attestation - Gordonibe  documentation  All medical record entries made by the Scribe were at my direction and personally dictated by me. I have reviewed the chart and agree that the record accurately reflects my personal performance of the history, physical exam, discussion and plan.  1. Hypertension, unspecified type        2. Benign prostatic hyperplasia with urinary retention        3. Depression, unspecified depression type

## 2023-11-01 ENCOUNTER — NURSING HOME VISIT (OUTPATIENT)
Dept: POST ACUTE CARE | Facility: EXTERNAL LOCATION | Age: 73
End: 2023-11-01
Payer: MEDICAID

## 2023-11-01 DIAGNOSIS — F32.A DEPRESSION, UNSPECIFIED DEPRESSION TYPE: ICD-10-CM

## 2023-11-01 DIAGNOSIS — R33.8 BENIGN PROSTATIC HYPERPLASIA WITH URINARY RETENTION: ICD-10-CM

## 2023-11-01 DIAGNOSIS — N40.1 BENIGN PROSTATIC HYPERPLASIA WITH URINARY RETENTION: ICD-10-CM

## 2023-11-01 DIAGNOSIS — I10 HYPERTENSION, UNSPECIFIED TYPE: Primary | ICD-10-CM

## 2023-11-01 DIAGNOSIS — I63.9 CEREBROVASCULAR ACCIDENT (CVA), UNSPECIFIED MECHANISM (MULTI): ICD-10-CM

## 2023-11-01 PROCEDURE — 99309 SBSQ NF CARE MODERATE MDM 30: CPT | Performed by: INTERNAL MEDICINE

## 2023-11-01 NOTE — LETTER
Patient: Hernando Minor  : 1950    Encounter Date: 2023    PROGRESS NOTE    Subjective  Chief complaint: Hernando Minor is a 73 y.o. male who is a long term care patient being seen and evaluated for monthly general medical care and follow-up.    HPI:    Patient with hx oc CVA presents for general medical care and f/u.  Patient seen and examined at bedside.  No issues per nursing.  Patient has no acute complaints.  HTN BP at goal.  Denies chest pain and headache.    BPH stable, denies difficulty voiding, urinary frequency, and weak stream.  Patient with diagnosis of depression.  Mood is stable.  Denies feeling down and thoughts of harming self or others. Denies changes in speech or increased weakness. No acute distress.         Objective  Vital signs: 129/74, 97%    Physical Exam  Constitutional:       General: He is not in acute distress.  Eyes:      Extraocular Movements: Extraocular movements intact.   Cardiovascular:      Rate and Rhythm: Normal rate and regular rhythm.   Pulmonary:      Effort: Pulmonary effort is normal.      Breath sounds: Normal breath sounds.   Abdominal:      General: Bowel sounds are normal.      Palpations: Abdomen is soft.   Musculoskeletal:      Cervical back: Neck supple.      Right lower leg: No edema.      Left lower leg: No edema.   Neurological:      Mental Status: He is alert.   Psychiatric:         Mood and Affect: Mood normal.         Behavior: Behavior is cooperative.         Assessment/Plan  Problem List Items Addressed This Visit       HTN (hypertension) - Primary     BP at goal, continue anihypertensives  Controlled  Monitor BP         Depression     Mood stable  Continue to monitor mood and behaviors         BPH (benign prostatic hyperplasia)     Stable  Continue Tamsulosin  Monitor for symptoms         CVA (cerebral vascular accident) (CMS/[facility])     Monitor for weakness and changes in speech        Medications, treatments, and labs reviewed  Continue medications  and treatments as listed in [facility]    Scribe Attestation  By signing my name below, I, Anna Mejia   attest that this documentation has been prepared under the direction and in the presence of Papo Dupont MD.    Provider Attestation - Scribe documentation  All medical record entries made by the Scribe were at my direction and personally dictated by me. I have reviewed the chart and agree that the record accurately reflects my personal performance of the history, physical exam, discussion and plan.  1. Hypertension, unspecified type        2. Depression, unspecified depression type        3. Cerebrovascular accident (CVA), unspecified mechanism (CMS/HCC)        4. Benign prostatic hyperplasia with urinary retention              Electronically Signed By: Papo Dupont MD   11/1/23  3:42 PM

## 2023-11-01 NOTE — PROGRESS NOTES
PROGRESS NOTE    Subjective   Chief complaint: Hernando Minor is a 73 y.o. male who is a long term care patient being seen and evaluated for monthly general medical care and follow-up.    HPI:    Patient with hx oc CVA presents for general medical care and f/u.  Patient seen and examined at bedside.  No issues per nursing.  Patient has no acute complaints.  HTN BP at goal.  Denies chest pain and headache.    BPH stable, denies difficulty voiding, urinary frequency, and weak stream.  Patient with diagnosis of depression.  Mood is stable.  Denies feeling down and thoughts of harming self or others. Denies changes in speech or increased weakness. No acute distress.         Objective   Vital signs: 129/74, 97%    Physical Exam  Constitutional:       General: He is not in acute distress.  Eyes:      Extraocular Movements: Extraocular movements intact.   Cardiovascular:      Rate and Rhythm: Normal rate and regular rhythm.   Pulmonary:      Effort: Pulmonary effort is normal.      Breath sounds: Normal breath sounds.   Abdominal:      General: Bowel sounds are normal.      Palpations: Abdomen is soft.   Musculoskeletal:      Cervical back: Neck supple.      Right lower leg: No edema.      Left lower leg: No edema.   Neurological:      Mental Status: He is alert.   Psychiatric:         Mood and Affect: Mood normal.         Behavior: Behavior is cooperative.         Assessment/Plan   Problem List Items Addressed This Visit       HTN (hypertension) - Primary     BP at goal, continue anihypertensives  Controlled  Monitor BP         Depression     Mood stable  Continue to monitor mood and behaviors         BPH (benign prostatic hyperplasia)     Stable  Continue Tamsulosin  Monitor for symptoms         CVA (cerebral vascular accident) (CMS/[facility])     Monitor for weakness and changes in speech        Medications, treatments, and labs reviewed  Continue medications and treatments as listed in [facility]    Scribe Attestation  By signing my  name below, I, Anna Mejia   attest that this documentation has been prepared under the direction and in the presence of Papo Dupont MD.    Provider Attestation - Scribe documentation  All medical record entries made by the Scribe were at my direction and personally dictated by me. I have reviewed the chart and agree that the record accurately reflects my personal performance of the history, physical exam, discussion and plan.  1. Hypertension, unspecified type        2. Depression, unspecified depression type        3. Cerebrovascular accident (CVA), unspecified mechanism (CMS/HCC)        4. Benign prostatic hyperplasia with urinary retention

## 2023-11-20 ENCOUNTER — NURSING HOME VISIT (OUTPATIENT)
Dept: POST ACUTE CARE | Facility: EXTERNAL LOCATION | Age: 73
End: 2023-11-20
Payer: MEDICAID

## 2023-11-20 DIAGNOSIS — K59.01 SLOW TRANSIT CONSTIPATION: Primary | ICD-10-CM

## 2023-11-20 DIAGNOSIS — I10 HYPERTENSION, UNSPECIFIED TYPE: ICD-10-CM

## 2023-11-20 PROCEDURE — 99308 SBSQ NF CARE LOW MDM 20: CPT | Performed by: NURSE PRACTITIONER

## 2023-11-20 NOTE — LETTER
Patient: Hernando Minor  : 1950    Encounter Date: 2023    PROGRESS NOTE    Subjective  Chief complaint: Hernando Minor is a 73 y.o. male who is a long term care patient being seen and evaluated for reported abdominal pain     HPI: Nursing reports that he had complained of abdominal discomfort.  KUB done - negative. Is currently in bed. Cooperative. Denies any discomfort.   HPI      Objective  Vital signs: 132/66-82-18-97.7    Physical Exam  Constitutional:       General: He is not in acute distress.  Eyes:      Extraocular Movements: Extraocular movements intact.   Cardiovascular:      Rate and Rhythm: Normal rate and regular rhythm.   Pulmonary:      Effort: Pulmonary effort is normal.      Breath sounds: Normal breath sounds.   Abdominal:      General: Bowel sounds are normal.      Palpations: Abdomen is soft.      Comments: Abdomen is soft   BS active x 4     Musculoskeletal:      Cervical back: Neck supple.      Right lower leg: No edema.      Left lower leg: No edema.   Neurological:      Mental Status: He is alert.   Psychiatric:         Mood and Affect: Mood normal.         Behavior: Behavior is cooperative.         Assessment/Plan  Problem List Items Addressed This Visit       HTN (hypertension)     BP at goal, continue anihypertensives  Controlled  Monitor BP         Constipation - Primary     Reported abdominal pain  Has not had recent BM   KuB done   Started miralax  Advised to increase fluids           Medications, treatments, and labs reviewed  Continue medications and treatments as listed in EMR    NIKKI Cruz      Electronically Signed By: NIKKI Cruz   23  1:23 PM

## 2023-11-22 ENCOUNTER — NURSING HOME VISIT (OUTPATIENT)
Dept: POST ACUTE CARE | Facility: EXTERNAL LOCATION | Age: 73
End: 2023-11-22
Payer: MEDICAID

## 2023-11-22 DIAGNOSIS — I63.9 CEREBROVASCULAR ACCIDENT (CVA), UNSPECIFIED MECHANISM (MULTI): Primary | ICD-10-CM

## 2023-11-22 DIAGNOSIS — R19.7 DIARRHEA, UNSPECIFIED TYPE: ICD-10-CM

## 2023-11-22 PROCEDURE — 99308 SBSQ NF CARE LOW MDM 20: CPT | Performed by: INTERNAL MEDICINE

## 2023-11-22 NOTE — PROGRESS NOTES
PROGRESS NOTE    Subjective   Chief complaint: Hernando Minor is a 73 y.o. male who is a long term care patient being seen and evaluated for loose stool    HPI:  Patient with hx of CVA, presented with complaints of loose stools and was started on Mylanta. He is feeling better. Denies ABD pain, gas, nausea or vomiting. Denies increased weakness or changes in speech. No other concerns at this time. No acute distress.       Objective   Vital signs: 126/74, 98%    Physical Exam  Constitutional:       General: He is not in acute distress.     Appearance: He is obese.   Eyes:      Extraocular Movements: Extraocular movements intact.   Cardiovascular:      Rate and Rhythm: Normal rate and regular rhythm.   Pulmonary:      Effort: Pulmonary effort is normal.      Breath sounds: Normal breath sounds.   Abdominal:      General: Bowel sounds are normal.      Palpations: Abdomen is soft.   Musculoskeletal:         General: Normal range of motion.      Cervical back: Neck supple.      Right lower leg: No edema.      Left lower leg: No edema.   Skin:     General: Skin is warm and dry.   Neurological:      General: No focal deficit present.      Mental Status: He is alert and oriented to person, place, and time. Mental status is at baseline.   Psychiatric:         Mood and Affect: Mood normal.         Behavior: Behavior normal. Behavior is cooperative.         Thought Content: Thought content normal.         Assessment/Plan   Problem List Items Addressed This Visit       CVA (cerebral vascular accident) (CMS/[facility]) - Primary     Monitor for weakness and changes in speech         Diarrhea     Improving with current meds  monitor          Medications, treatments, and labs reviewed  Continue medications and treatments as listed in [facility]    Scribe Attestation  By signing my name below, Treasure MARQUES, Scribe   attest that this documentation has been prepared under the direction and in the presence of Papo Dupont MD.    Provider  Attestation - Scribe documentation  All medical record entries made by the Scribe were at my direction and personally dictated by me. I have reviewed the chart and agree that the record accurately reflects my personal performance of the history, physical exam, discussion and plan.    1. Cerebrovascular accident (CVA), unspecified mechanism (CMS/HCC)        2. Diarrhea, unspecified type

## 2023-11-22 NOTE — LETTER
Patient: Hernando Minor  : 1950    Encounter Date: 2023    PROGRESS NOTE    Subjective  Chief complaint: Hernando Minor is a 73 y.o. male who is a long term care patient being seen and evaluated for loose stool    HPI:  Patient with hx of CVA, presented with complaints of loose stools and was started on Mylanta. He is feeling better. Denies ABD pain, gas, nausea or vomiting. Denies increased weakness or changes in speech. No other concerns at this time. No acute distress.       Objective  Vital signs: 126/74, 98%    Physical Exam  Constitutional:       General: He is not in acute distress.     Appearance: He is obese.   Eyes:      Extraocular Movements: Extraocular movements intact.   Cardiovascular:      Rate and Rhythm: Normal rate and regular rhythm.   Pulmonary:      Effort: Pulmonary effort is normal.      Breath sounds: Normal breath sounds.   Abdominal:      General: Bowel sounds are normal.      Palpations: Abdomen is soft.   Musculoskeletal:         General: Normal range of motion.      Cervical back: Neck supple.      Right lower leg: No edema.      Left lower leg: No edema.   Skin:     General: Skin is warm and dry.   Neurological:      General: No focal deficit present.      Mental Status: He is alert and oriented to person, place, and time. Mental status is at baseline.   Psychiatric:         Mood and Affect: Mood normal.         Behavior: Behavior normal. Behavior is cooperative.         Thought Content: Thought content normal.         Assessment/Plan  Problem List Items Addressed This Visit       CVA (cerebral vascular accident) (CMS/[facility]) - Primary     Monitor for weakness and changes in speech         Diarrhea     Improving with current meds  monitor          Medications, treatments, and labs reviewed  Continue medications and treatments as listed in [facility]    Scribe Attestation  By signing my name below, Treasure MARQUES, Scribe   attest that this documentation has been prepared under the  direction and in the presence of Papo Dupont MD.    Provider Attestation - Scribe documentation  All medical record entries made by the Scribe were at my direction and personally dictated by me. I have reviewed the chart and agree that the record accurately reflects my personal performance of the history, physical exam, discussion and plan.    1. Cerebrovascular accident (CVA), unspecified mechanism (CMS/HCC)        2. Diarrhea, unspecified type               Electronically Signed By: Papo Dupont MD   11/22/23  6:04 PM

## 2023-11-23 PROBLEM — K59.00 CONSTIPATION: Status: ACTIVE | Noted: 2023-11-23

## 2023-11-23 NOTE — ASSESSMENT & PLAN NOTE
Reported abdominal pain  Has not had recent BM   KuB done   Started miralax  Advised to increase fluids

## 2023-11-23 NOTE — PROGRESS NOTES
PROGRESS NOTE    Subjective   Chief complaint: Hernando Minor is a 73 y.o. male who is a long term care patient being seen and evaluated for reported abdominal pain     HPI: Nursing reports that he had complained of abdominal discomfort.  KUB done - negative. Is currently in bed. Cooperative. Denies any discomfort.   HPI      Objective   Vital signs: 132/66-82-18-97.7    Physical Exam  Constitutional:       General: He is not in acute distress.  Eyes:      Extraocular Movements: Extraocular movements intact.   Cardiovascular:      Rate and Rhythm: Normal rate and regular rhythm.   Pulmonary:      Effort: Pulmonary effort is normal.      Breath sounds: Normal breath sounds.   Abdominal:      General: Bowel sounds are normal.      Palpations: Abdomen is soft.      Comments: Abdomen is soft   BS active x 4     Musculoskeletal:      Cervical back: Neck supple.      Right lower leg: No edema.      Left lower leg: No edema.   Neurological:      Mental Status: He is alert.   Psychiatric:         Mood and Affect: Mood normal.         Behavior: Behavior is cooperative.         Assessment/Plan   Problem List Items Addressed This Visit       HTN (hypertension)     BP at goal, continue anihypertensives  Controlled  Monitor BP         Constipation - Primary     Reported abdominal pain  Has not had recent BM   KuB done   Started miralax  Advised to increase fluids           Medications, treatments, and labs reviewed  Continue medications and treatments as listed in EMR    Jolanta Rainey, APRN-CNP

## 2023-12-06 ENCOUNTER — NURSING HOME VISIT (OUTPATIENT)
Dept: POST ACUTE CARE | Facility: EXTERNAL LOCATION | Age: 73
End: 2023-12-06
Payer: MEDICAID

## 2023-12-06 DIAGNOSIS — I10 HYPERTENSION, UNSPECIFIED TYPE: Primary | ICD-10-CM

## 2023-12-06 DIAGNOSIS — R33.8 BENIGN PROSTATIC HYPERPLASIA WITH URINARY RETENTION: ICD-10-CM

## 2023-12-06 DIAGNOSIS — N40.1 BENIGN PROSTATIC HYPERPLASIA WITH URINARY RETENTION: ICD-10-CM

## 2023-12-06 DIAGNOSIS — F32.A DEPRESSION, UNSPECIFIED DEPRESSION TYPE: ICD-10-CM

## 2023-12-06 PROCEDURE — 99309 SBSQ NF CARE MODERATE MDM 30: CPT | Performed by: INTERNAL MEDICINE

## 2023-12-06 NOTE — PROGRESS NOTES
PROGRESS NOTE    Subjective   Chief complaint: Hernando Minor is a 73 y.o. male who is a long term care patient being seen and evaluated for monthly general medical care and follow-up.    HPI:    Patient presents for general medical care and f/u.  Patient seen and examined at bedside.  No issues per nursing.  Patient has no acute complaints.  HTN BP at goal.  Denies chest pain and headache.    BPH stable, denies difficulty voiding, urinary frequency, and weak stream.  Patient with diagnosis of depression.  Mood is stable.  Denies feeling down and thoughts of harming self or others. No acute distress.         Objective   Vital signs: 124/72, 97%    Physical Exam  Constitutional:       General: He is not in acute distress.  Eyes:      Extraocular Movements: Extraocular movements intact.   Cardiovascular:      Rate and Rhythm: Normal rate and regular rhythm.   Pulmonary:      Effort: Pulmonary effort is normal.      Breath sounds: Normal breath sounds.   Abdominal:      General: Bowel sounds are normal.      Palpations: Abdomen is soft.   Musculoskeletal:      Cervical back: Neck supple.      Right lower leg: No edema.      Left lower leg: No edema.   Neurological:      Mental Status: He is alert.   Psychiatric:         Mood and Affect: Mood normal.         Behavior: Behavior is cooperative.         Assessment/Plan   Problem List Items Addressed This Visit       HTN (hypertension) - Primary     BP at goal, continue anihypertensives  Controlled  Monitor BP         Depression     Mood stable  Continue to monitor mood and behaviors         BPH (benign prostatic hyperplasia)     Stable  Continue Tamsulosin  Monitor for symptoms        Medications, treatments, and labs reviewed  Continue medications and treatments as listed in [facility]    Scribe Attestation  By signing my name below, Treasure MARQUES, Scriblaura   attest that this documentation has been prepared under the direction and in the presence of Papo Dupont  MD.    Provider Attestation - Scribe documentation  All medical record entries made by the Scribe were at my direction and personally dictated by me. I have reviewed the chart and agree that the record accurately reflects my personal performance of the history, physical exam, discussion and plan.

## 2023-12-06 NOTE — LETTER
Patient: Hernando Minor  : 1950    Encounter Date: 2023    PROGRESS NOTE    Subjective  Chief complaint: Hernando Minor is a 73 y.o. male who is a long term care patient being seen and evaluated for monthly general medical care and follow-up.    HPI:    Patient presents for general medical care and f/u.  Patient seen and examined at bedside.  No issues per nursing.  Patient has no acute complaints.  HTN BP at goal.  Denies chest pain and headache.    BPH stable, denies difficulty voiding, urinary frequency, and weak stream.  Patient with diagnosis of depression.  Mood is stable.  Denies feeling down and thoughts of harming self or others. No acute distress.         Objective  Vital signs: 124/72, 97%    Physical Exam  Constitutional:       General: He is not in acute distress.  Eyes:      Extraocular Movements: Extraocular movements intact.   Cardiovascular:      Rate and Rhythm: Normal rate and regular rhythm.   Pulmonary:      Effort: Pulmonary effort is normal.      Breath sounds: Normal breath sounds.   Abdominal:      General: Bowel sounds are normal.      Palpations: Abdomen is soft.   Musculoskeletal:      Cervical back: Neck supple.      Right lower leg: No edema.      Left lower leg: No edema.   Neurological:      Mental Status: He is alert.   Psychiatric:         Mood and Affect: Mood normal.         Behavior: Behavior is cooperative.         Assessment/Plan  Problem List Items Addressed This Visit       HTN (hypertension) - Primary     BP at goal, continue anihypertensives  Controlled  Monitor BP         Depression     Mood stable  Continue to monitor mood and behaviors         BPH (benign prostatic hyperplasia)     Stable  Continue Tamsulosin  Monitor for symptoms        Medications, treatments, and labs reviewed  Continue medications and treatments as listed in [facility]    Scribe Attestation  By signing my name below, ITreasure, Anna   attest that this documentation has been prepared under  the direction and in the presence of Papo Dupont MD.    Provider Attestation - Scribe documentation  All medical record entries made by the Scribe were at my direction and personally dictated by me. I have reviewed the chart and agree that the record accurately reflects my personal performance of the history, physical exam, discussion and plan.        Electronically Signed By: Papo Dupont MD   12/6/23  5:08 PM

## 2024-01-03 ENCOUNTER — NURSING HOME VISIT (OUTPATIENT)
Dept: POST ACUTE CARE | Facility: EXTERNAL LOCATION | Age: 74
End: 2024-01-03
Payer: MEDICAID

## 2024-01-03 DIAGNOSIS — I63.9 CEREBROVASCULAR ACCIDENT (CVA), UNSPECIFIED MECHANISM (MULTI): ICD-10-CM

## 2024-01-03 DIAGNOSIS — R33.8 BENIGN PROSTATIC HYPERPLASIA WITH URINARY RETENTION: ICD-10-CM

## 2024-01-03 DIAGNOSIS — N40.1 BENIGN PROSTATIC HYPERPLASIA WITH URINARY RETENTION: ICD-10-CM

## 2024-01-03 DIAGNOSIS — F32.A DEPRESSION, UNSPECIFIED DEPRESSION TYPE: ICD-10-CM

## 2024-01-03 DIAGNOSIS — I10 HYPERTENSION, UNSPECIFIED TYPE: Primary | ICD-10-CM

## 2024-01-03 PROCEDURE — 99309 SBSQ NF CARE MODERATE MDM 30: CPT | Performed by: INTERNAL MEDICINE

## 2024-01-03 NOTE — LETTER
Patient: Hernando Minor  : 1950    Encounter Date: 2024    PROGRESS NOTE    Subjective  Chief complaint: Hernando Minor is a 73 y.o. male who is a long term care patient being seen and evaluated for monthly general medical care and follow-up    HPI:  HPI  Patient presents for general medical care and f/u.  Patient seen and examined at bedside.  No issues per nursing.  Patient has no acute complaints.  HTN BP at goal.  Denies chest pain and headache.  Hx CVA, denies changes in weakness and speech.  BPH stable, denies difficulty voiding, urinary frequency, and weak stream.  Patient with diagnosis of depression.  Mood is stable.  Denies feeling down and thoughts of harming self or others.  Patient no acute distress.    Objective  Vital signs: 129/67, 51    Physical Exam  Constitutional:       General: He is not in acute distress.  Eyes:      Extraocular Movements: Extraocular movements intact.   Cardiovascular:      Rate and Rhythm: Normal rate and regular rhythm.   Pulmonary:      Effort: Pulmonary effort is normal.      Breath sounds: Normal breath sounds.   Abdominal:      General: Bowel sounds are normal.      Palpations: Abdomen is soft.   Musculoskeletal:      Cervical back: Neck supple.      Right lower leg: No edema.      Left lower leg: No edema.   Neurological:      Mental Status: He is alert.   Psychiatric:         Mood and Affect: Mood normal.         Behavior: Behavior is cooperative.         Assessment/Plan  Problem List Items Addressed This Visit       HTN (hypertension) - Primary     BP at goal, continue anihypertensives  Controlled  Monitor BP         Depression     Mood stable  Continue to monitor mood and behaviors         BPH (benign prostatic hyperplasia)     Stable  Continue Tamsulosin  Monitor for symptoms         CVA (cerebral vascular accident) (CMS/[facility])     Monitor for weakness and changes in speech          Medications, treatments, and labs reviewed  Continue medications and  treatments as listed in EMR    Scribe Attestation  I, Anna Garcia   attest that this documentation has been prepared under the direction and in the presence of Papo Dupont MD    Provider Attestation - Scribe documentation  All medical record entries made by the Scribe were at my direction and personally dictated by me. I have reviewed the chart and agree that the record accurately reflects my personal performance of the history, physical exam, discussion and plan.   Papo Dupont MD            Electronically Signed By: Papo Dupont MD   1/3/24  7:16 PM

## 2024-01-03 NOTE — PROGRESS NOTES
PROGRESS NOTE    Subjective   Chief complaint: Hernando Minor is a 73 y.o. male who is a long term care patient being seen and evaluated for monthly general medical care and follow-up    HPI:  HPI  Patient presents for general medical care and f/u.  Patient seen and examined at bedside.  No issues per nursing.  Patient has no acute complaints.  HTN BP at goal.  Denies chest pain and headache.  Hx CVA, denies changes in weakness and speech.  BPH stable, denies difficulty voiding, urinary frequency, and weak stream.  Patient with diagnosis of depression.  Mood is stable.  Denies feeling down and thoughts of harming self or others.  Patient no acute distress.    Objective   Vital signs: 129/67, 51    Physical Exam  Constitutional:       General: He is not in acute distress.  Eyes:      Extraocular Movements: Extraocular movements intact.   Cardiovascular:      Rate and Rhythm: Normal rate and regular rhythm.   Pulmonary:      Effort: Pulmonary effort is normal.      Breath sounds: Normal breath sounds.   Abdominal:      General: Bowel sounds are normal.      Palpations: Abdomen is soft.   Musculoskeletal:      Cervical back: Neck supple.      Right lower leg: No edema.      Left lower leg: No edema.   Neurological:      Mental Status: He is alert.   Psychiatric:         Mood and Affect: Mood normal.         Behavior: Behavior is cooperative.         Assessment/Plan   Problem List Items Addressed This Visit       HTN (hypertension) - Primary     BP at goal, continue anihypertensives  Controlled  Monitor BP         Depression     Mood stable  Continue to monitor mood and behaviors         BPH (benign prostatic hyperplasia)     Stable  Continue Tamsulosin  Monitor for symptoms         CVA (cerebral vascular accident) (CMS/[facility])     Monitor for weakness and changes in speech          Medications, treatments, and labs reviewed  Continue medications and treatments as listed in EMR    Scribe Flaquitaation  SHELLI, Sabrina Louise,  Scribe   attest that this documentation has been prepared under the direction and in the presence of Papo Dupont MD    Provider Attestation - Scribe documentation  All medical record entries made by the Scribe were at my direction and personally dictated by me. I have reviewed the chart and agree that the record accurately reflects my personal performance of the history, physical exam, discussion and plan.   Papo Dupont MD

## 2024-01-04 ENCOUNTER — NURSING HOME VISIT (OUTPATIENT)
Dept: POST ACUTE CARE | Facility: EXTERNAL LOCATION | Age: 74
End: 2024-01-04
Payer: MEDICAID

## 2024-01-04 DIAGNOSIS — R53.1 WEAKNESS: ICD-10-CM

## 2024-01-04 DIAGNOSIS — I15.9 SECONDARY HYPERTENSION: Primary | ICD-10-CM

## 2024-01-04 DIAGNOSIS — K21.00 GASTROESOPHAGEAL REFLUX DISEASE WITH ESOPHAGITIS WITHOUT HEMORRHAGE: ICD-10-CM

## 2024-01-04 PROCEDURE — 99309 SBSQ NF CARE MODERATE MDM 30: CPT | Performed by: NURSE PRACTITIONER

## 2024-01-04 NOTE — LETTER
Patient: Hernando Minor  : 1950    Encounter Date: 2024    PROGRESS NOTE    Subjective  Chief complaint: Hernando Minor is a 73 y.o. male who is a long term care patient being seen and evaluated for gastric distress    HPI: Nursing reports that he has been complaining of gastric distress. Is currently in therapy working with OT. Is pleasant and talkative. Denies any pain at this time. He reports that his stomach was bothering him a few days ago.   HPI      Objective  Vital signs: 132/74-72-18-97.4     Physical Exam  Vitals and nursing note reviewed.   Constitutional:       General: He is not in acute distress.  Eyes:      Extraocular Movements: Extraocular movements intact.   Cardiovascular:      Rate and Rhythm: Normal rate and regular rhythm.   Pulmonary:      Effort: Pulmonary effort is normal.      Breath sounds: Normal breath sounds.      Comments: Lungs clear   Abdominal:      General: Bowel sounds are normal.      Palpations: Abdomen is soft.      Comments: Abdomen soft   Denies any epigastric pain or tenderness   BS x4    Musculoskeletal:      Cervical back: Neck supple.      Right lower leg: No edema.      Left lower leg: No edema.   Neurological:      Mental Status: He is alert.   Psychiatric:         Mood and Affect: Mood normal.         Behavior: Behavior is cooperative.         Assessment/Plan  Problem List Items Addressed This Visit       HTN (hypertension) - Primary     BP at goal, continue anihypertensives  Controlled  Monitor BP  Lungs clear          Weakness     Remains in therapy   OT reports that he is doing well   Motivated          GERD (gastroesophageal reflux disease)     Reports hx gastric distress   Currently denies any burning   No epigastric pain or tenderness   Monitor symptoms           Medications, treatments, and labs reviewed  Continue medications and treatments as listed in EMR    NIKKI Cruz      Electronically Signed By: NIKKI Cruz   24  12:05 PM

## 2024-01-06 PROBLEM — K21.9 GERD (GASTROESOPHAGEAL REFLUX DISEASE): Status: ACTIVE | Noted: 2024-01-06

## 2024-01-06 NOTE — ASSESSMENT & PLAN NOTE
Reports hx gastric distress   Currently denies any burning   No epigastric pain or tenderness   Monitor symptoms

## 2024-01-06 NOTE — PROGRESS NOTES
PROGRESS NOTE    Subjective   Chief complaint: Hernando Minor is a 73 y.o. male who is a long term care patient being seen and evaluated for gastric distress    HPI: Nursing reports that he has been complaining of gastric distress. Is currently in therapy working with OT. Is pleasant and talkative. Denies any pain at this time. He reports that his stomach was bothering him a few days ago.   HPI      Objective   Vital signs: 132/74-72-18-97.4     Physical Exam  Vitals and nursing note reviewed.   Constitutional:       General: He is not in acute distress.  Eyes:      Extraocular Movements: Extraocular movements intact.   Cardiovascular:      Rate and Rhythm: Normal rate and regular rhythm.   Pulmonary:      Effort: Pulmonary effort is normal.      Breath sounds: Normal breath sounds.      Comments: Lungs clear   Abdominal:      General: Bowel sounds are normal.      Palpations: Abdomen is soft.      Comments: Abdomen soft   Denies any epigastric pain or tenderness   BS x4    Musculoskeletal:      Cervical back: Neck supple.      Right lower leg: No edema.      Left lower leg: No edema.   Neurological:      Mental Status: He is alert.   Psychiatric:         Mood and Affect: Mood normal.         Behavior: Behavior is cooperative.         Assessment/Plan   Problem List Items Addressed This Visit       HTN (hypertension) - Primary     BP at goal, continue anihypertensives  Controlled  Monitor BP  Lungs clear          Weakness     Remains in therapy   OT reports that he is doing well   Motivated          GERD (gastroesophageal reflux disease)     Reports hx gastric distress   Currently denies any burning   No epigastric pain or tenderness   Monitor symptoms           Medications, treatments, and labs reviewed  Continue medications and treatments as listed in EMR    Jolanta Rainey, APRN-CNP

## 2024-01-18 ENCOUNTER — NURSING HOME VISIT (OUTPATIENT)
Dept: POST ACUTE CARE | Facility: EXTERNAL LOCATION | Age: 74
End: 2024-01-18
Payer: MEDICAID

## 2024-01-18 DIAGNOSIS — I15.9 SECONDARY HYPERTENSION: ICD-10-CM

## 2024-01-18 DIAGNOSIS — E55.9 VITAMIN D DEFICIENCY: Primary | ICD-10-CM

## 2024-01-18 DIAGNOSIS — F32.3 CURRENT SEVERE EPISODE OF MAJOR DEPRESSIVE DISORDER WITH PSYCHOTIC FEATURES, UNSPECIFIED WHETHER RECURRENT (MULTI): ICD-10-CM

## 2024-01-18 PROCEDURE — 99309 SBSQ NF CARE MODERATE MDM 30: CPT | Performed by: NURSE PRACTITIONER

## 2024-01-18 NOTE — LETTER
Patient: Hernando Minor  : 1950    Encounter Date: 2024    PROGRESS NOTE    Subjective  Chief complaint: Hernando Minor is a 73 y.o. male who is a long term care patient being seen and evaluated for pharmacist recommendations    HPI: Pharmacy recommendation reviewed regarding vitamin D supplementation.   He is pleasant and talkative. Playing cards. Denies any chest pain or tightness. Is scheduled to attend therapy. .   HPI      Objective  Vital signs: 118/72-18-97.4     Physical Exam  Vitals and nursing note reviewed.   Constitutional:       General: He is not in acute distress.  Eyes:      Extraocular Movements: Extraocular movements intact.   Cardiovascular:      Rate and Rhythm: Normal rate and regular rhythm.   Pulmonary:      Effort: Pulmonary effort is normal.      Breath sounds: Normal breath sounds.      Comments: Lungs clear   Abdominal:      General: Bowel sounds are normal.      Palpations: Abdomen is soft.   Musculoskeletal:      Cervical back: Neck supple.      Right lower leg: No edema.      Left lower leg: No edema.   Neurological:      Mental Status: He is alert.   Psychiatric:         Mood and Affect: Mood normal.         Behavior: Behavior is cooperative.         Assessment/Plan  Problem List Items Addressed This Visit       HTN (hypertension)     BP at goal, continue anihypertensives  Controlled  Monitor BP  Lungs clear          Depression     Social   Talkative   Engaged in therapy and activities of interest   Continue to monitor mood and behaviors         Vitamin D deficiency - Primary     Reviewed pharmacist recommendation   Vit d supplement adjusted    Monitor labs           Medications, treatments, and labs reviewed  Continue medications and treatments as listed in EMR    NIKKI Cruz      Electronically Signed By: NIKKI Cruz   24  9:30 AM

## 2024-01-20 PROBLEM — E55.9 VITAMIN D DEFICIENCY: Status: ACTIVE | Noted: 2024-01-20

## 2024-01-20 NOTE — ASSESSMENT & PLAN NOTE
Social   Talkative   Engaged in therapy and activities of interest   Continue to monitor mood and behaviors

## 2024-01-20 NOTE — PROGRESS NOTES
PROGRESS NOTE    Subjective   Chief complaint: Hernando Minor is a 73 y.o. male who is a long term care patient being seen and evaluated for pharmacist recommendations    HPI: Pharmacy recommendation reviewed regarding vitamin D supplementation.   He is pleasant and talkative. Playing cards. Denies any chest pain or tightness. Is scheduled to attend therapy. .   HPI      Objective   Vital signs: 118/72-18-97.4     Physical Exam  Vitals and nursing note reviewed.   Constitutional:       General: He is not in acute distress.  Eyes:      Extraocular Movements: Extraocular movements intact.   Cardiovascular:      Rate and Rhythm: Normal rate and regular rhythm.   Pulmonary:      Effort: Pulmonary effort is normal.      Breath sounds: Normal breath sounds.      Comments: Lungs clear   Abdominal:      General: Bowel sounds are normal.      Palpations: Abdomen is soft.   Musculoskeletal:      Cervical back: Neck supple.      Right lower leg: No edema.      Left lower leg: No edema.   Neurological:      Mental Status: He is alert.   Psychiatric:         Mood and Affect: Mood normal.         Behavior: Behavior is cooperative.         Assessment/Plan   Problem List Items Addressed This Visit       HTN (hypertension)     BP at goal, continue anihypertensives  Controlled  Monitor BP  Lungs clear          Depression     Social   Talkative   Engaged in therapy and activities of interest   Continue to monitor mood and behaviors         Vitamin D deficiency - Primary     Reviewed pharmacist recommendation   Vit d supplement adjusted    Monitor labs           Medications, treatments, and labs reviewed  Continue medications and treatments as listed in EMR    Jolanta Rainey, GABBI-CNP     Room 7    Chief Complaint   Patient presents with    Hypertension     f/u    Diabetes     f/u     1. Have you been to the ER, urgent care clinic since your last visit? Hospitalized since your last visit? No    2. Have you seen or consulted any other health care providers outside of the [address] [place] [address] since your last visit? Include any pap smears or colon screening. Yes When: 2019 Where: Dr. Radha Ross Reason for visit: Diabetes Management    Health Maintenance Due   Topic Date Due    EYE EXAM RETINAL OR DILATED  1960    DTaP/Tdap/Td series (1 - Tdap) 1971    Shingrix Vaccine Age 50> (1 of 2) 2000    Bone Densitometry (Dexa) Screening  2015    Pneumococcal 65+ years (1 of 2 - PCV13) 2015    GLAUCOMA SCREENING Q2Y  2017    MICROALBUMIN Q1  2019    HEMOGLOBIN A1C Q6M  2019    Influenza Age 9 to Adult  2019    FOOT EXAM Q1  10/01/2019    LIPID PANEL Q1  10/01/2019    MEDICARE YEARLY EXAM  10/02/2019       PHI request completed by patient to be faxed. Patient is followed by a diabetic specialist. Had an appointment in July and will go back on .    Patient saw Podiatry in September for foot exam with Dr. Jasmeet Motta. Patient had eye exam with Dr. Ferguson  September. Patient had flu vaccine 2 weeks ago. 3 most recent PHQ Screens 10/15/2019   Little interest or pleasure in doing things Not at all   Feeling down, depressed, irritable, or hopeless Not at all   Total Score PHQ 2 0     Fall Risk Assessment, last 12 mths 10/15/2019   Able to walk? Yes   Fall in past 12 months?  No

## 2024-02-07 ENCOUNTER — NURSING HOME VISIT (OUTPATIENT)
Dept: POST ACUTE CARE | Facility: EXTERNAL LOCATION | Age: 74
End: 2024-02-07
Payer: MEDICAID

## 2024-02-07 DIAGNOSIS — R33.8 BENIGN PROSTATIC HYPERPLASIA WITH URINARY RETENTION: ICD-10-CM

## 2024-02-07 DIAGNOSIS — F32.A DEPRESSION, UNSPECIFIED DEPRESSION TYPE: ICD-10-CM

## 2024-02-07 DIAGNOSIS — K21.9 GASTROESOPHAGEAL REFLUX DISEASE, UNSPECIFIED WHETHER ESOPHAGITIS PRESENT: ICD-10-CM

## 2024-02-07 DIAGNOSIS — I10 HYPERTENSION, UNSPECIFIED TYPE: ICD-10-CM

## 2024-02-07 DIAGNOSIS — I63.9 CEREBROVASCULAR ACCIDENT (CVA), UNSPECIFIED MECHANISM (MULTI): Primary | ICD-10-CM

## 2024-02-07 DIAGNOSIS — N40.1 BENIGN PROSTATIC HYPERPLASIA WITH URINARY RETENTION: ICD-10-CM

## 2024-02-07 PROCEDURE — 99309 SBSQ NF CARE MODERATE MDM 30: CPT | Performed by: INTERNAL MEDICINE

## 2024-02-07 NOTE — PROGRESS NOTES
PROGRESS NOTE    Subjective   Chief complaint: Hernando Minor is a 73 y.o. male who is a long term care patient being seen and evaluated for monthly general medical care and follow-up    HPI:  HPI  Patient presents for general medical care and f/u.  Patient seen and examined at bedside.  No issues per nursing.  Patient has no acute complaints.  Patient require assistance with ADLs.  HTN BP at goal.  Denies chest pain and headache.  Hx CVA, denies changes in weakness and speech.  BPH stable, denies difficulty voiding, urinary frequency, and weak stream.  GERD controlled.  Denies heartburn, regurgitation, epigastric discomfort, sour taste, and cough.  Patient with diagnosis of depression.  Mood is stable.  Denies feeling down and thoughts of harming self or others.  Mentation at baseline, no acute distress.    Objective   Vital signs: 134/77, 67, 95%    Physical Exam  Constitutional:       General: He is not in acute distress.  Eyes:      Extraocular Movements: Extraocular movements intact.   Cardiovascular:      Rate and Rhythm: Normal rate and regular rhythm.   Pulmonary:      Effort: Pulmonary effort is normal.      Breath sounds: Normal breath sounds.      Comments: Lungs clear   Abdominal:      General: Bowel sounds are normal.      Palpations: Abdomen is soft.   Musculoskeletal:      Cervical back: Neck supple.      Right lower leg: No edema.      Left lower leg: No edema.   Neurological:      Mental Status: He is alert.      Motor: Weakness present.   Psychiatric:         Mood and Affect: Mood normal.         Behavior: Behavior is cooperative.         Assessment/Plan   Problem List Items Addressed This Visit       HTN (hypertension)     BP at goal, continue anihypertensives  Controlled  Monitor BP         Depression     Mood stable  Continue to monitor mood and behaviors         BPH (benign prostatic hyperplasia)     Stable  Continue Tamsulosin  Monitor for symptoms         CVA (cerebral vascular accident)  (CMS/[facility]) - Primary     Monitor for weakness and changes in speech         GERD (gastroesophageal reflux disease)     Reports hx gastric distress   Currently denies any burning   No epigastric pain or tenderness   Monitor symptoms           Medications, treatments, and labs reviewed  Continue medications and treatments as listed in EMR    Scribe Attestation  ISabrina Scribe   attest that this documentation has been prepared under the direction and in the presence of Papo Dupont MD    Provider Attestation - Scribe documentation  All medical record entries made by the Scribe were at my direction and personally dictated by me. I have reviewed the chart and agree that the record accurately reflects my personal performance of the history, physical exam, discussion and plan.   Papo Dupont MD

## 2024-02-07 NOTE — LETTER
Patient: Hernando Minor  : 1950    Encounter Date: 2024    PROGRESS NOTE    Subjective  Chief complaint: Hernando Minor is a 73 y.o. male who is a long term care patient being seen and evaluated for monthly general medical care and follow-up    HPI:  HPI  Patient presents for general medical care and f/u.  Patient seen and examined at bedside.  No issues per nursing.  Patient has no acute complaints.  Patient require assistance with ADLs.  HTN BP at goal.  Denies chest pain and headache.  Hx CVA, denies changes in weakness and speech.  BPH stable, denies difficulty voiding, urinary frequency, and weak stream.  GERD controlled.  Denies heartburn, regurgitation, epigastric discomfort, sour taste, and cough.  Patient with diagnosis of depression.  Mood is stable.  Denies feeling down and thoughts of harming self or others.  Mentation at baseline, no acute distress.    Objective  Vital signs: 134/77, 67, 95%    Physical Exam  Constitutional:       General: He is not in acute distress.  Eyes:      Extraocular Movements: Extraocular movements intact.   Cardiovascular:      Rate and Rhythm: Normal rate and regular rhythm.   Pulmonary:      Effort: Pulmonary effort is normal.      Breath sounds: Normal breath sounds.      Comments: Lungs clear   Abdominal:      General: Bowel sounds are normal.      Palpations: Abdomen is soft.   Musculoskeletal:      Cervical back: Neck supple.      Right lower leg: No edema.      Left lower leg: No edema.   Neurological:      Mental Status: He is alert.      Motor: Weakness present.   Psychiatric:         Mood and Affect: Mood normal.         Behavior: Behavior is cooperative.         Assessment/Plan  Problem List Items Addressed This Visit       HTN (hypertension)     BP at goal, continue anihypertensives  Controlled  Monitor BP         Depression     Mood stable  Continue to monitor mood and behaviors         BPH (benign prostatic hyperplasia)     Stable  Continue  Tamsulosin  Monitor for symptoms         CVA (cerebral vascular accident) (CMS/[facility]) - Primary     Monitor for weakness and changes in speech         GERD (gastroesophageal reflux disease)     Reports hx gastric distress   Currently denies any burning   No epigastric pain or tenderness   Monitor symptoms           Medications, treatments, and labs reviewed  Continue medications and treatments as listed in EMR    Scribe Attestation  Sabrina MARQUES Scribe   attest that this documentation has been prepared under the direction and in the presence of Papo Dupont MD    Provider Attestation - Scribe documentation  All medical record entries made by the Scribe were at my direction and personally dictated by me. I have reviewed the chart and agree that the record accurately reflects my personal performance of the history, physical exam, discussion and plan.   Papo Dupont MD            Electronically Signed By: Papo Dupont MD   2/7/24  4:44 PM

## 2024-03-01 ENCOUNTER — NURSING HOME VISIT (OUTPATIENT)
Dept: POST ACUTE CARE | Facility: EXTERNAL LOCATION | Age: 74
End: 2024-03-01
Payer: MEDICAID

## 2024-03-01 DIAGNOSIS — R33.8 BENIGN PROSTATIC HYPERPLASIA WITH URINARY RETENTION: ICD-10-CM

## 2024-03-01 DIAGNOSIS — K21.9 GASTROESOPHAGEAL REFLUX DISEASE, UNSPECIFIED WHETHER ESOPHAGITIS PRESENT: ICD-10-CM

## 2024-03-01 DIAGNOSIS — N40.1 BENIGN PROSTATIC HYPERPLASIA WITH URINARY RETENTION: ICD-10-CM

## 2024-03-01 DIAGNOSIS — F31.9 BIPOLAR DEPRESSION (MULTI): ICD-10-CM

## 2024-03-01 DIAGNOSIS — I63.9 CEREBROVASCULAR ACCIDENT (CVA), UNSPECIFIED MECHANISM (MULTI): Primary | ICD-10-CM

## 2024-03-01 DIAGNOSIS — I10 HYPERTENSION, UNSPECIFIED TYPE: ICD-10-CM

## 2024-03-01 PROCEDURE — 99309 SBSQ NF CARE MODERATE MDM 30: CPT | Performed by: INTERNAL MEDICINE

## 2024-03-01 NOTE — LETTER
Patient: Hernando Minor  : 1950    Encounter Date: 2024    PROGRESS NOTE    Subjective  Chief complaint: Hernando Minor is a 73 y.o. male who is a long term care patient being seen and evaluated for monthly general medical care and follow-up    HPI:  HPI  Patient presents for general medical care and f/u.  Patient seen and examined at bedside.  No issues per nursing.  Patient has no acute complaints.  Patient requires assistance with ADLs.  HTN BP at goal.  Denies chest pain and headache.  Hx CVA, denies changes in weakness and speech.  BPH stable, denies difficulty voiding, urinary frequency, and weak stream.  GERD controlled.  Denies heartburn, regurgitation, epigastric discomfort, sour taste, and cough.  Patient with diagnosis of depression.  Mood is stable.  Denies feeling down and thoughts of harming self or others.  No acute distress.    Objective  Vital signs: 126/76, 66    Physical Exam  Constitutional:       General: He is not in acute distress.  Eyes:      Extraocular Movements: Extraocular movements intact.   Cardiovascular:      Rate and Rhythm: Normal rate and regular rhythm.   Pulmonary:      Effort: Pulmonary effort is normal.      Breath sounds: Normal breath sounds.      Comments: Lungs clear   Abdominal:      General: Bowel sounds are normal.      Palpations: Abdomen is soft.   Musculoskeletal:      Cervical back: Neck supple.      Right lower leg: No edema.      Left lower leg: No edema.   Neurological:      Mental Status: He is alert.   Psychiatric:         Mood and Affect: Mood normal.         Behavior: Behavior is cooperative.         Assessment/Plan  Problem List Items Addressed This Visit       HTN (hypertension)     BP at goal, continue anihypertensives  Controlled  Monitor BP         Bipolar depression (CMS/HCC)     Mood stable  Depakote  Zoloft  Continue to monitor mood and behaviors         BPH (benign prostatic hyperplasia)     Stable  Continue Tamsulosin  Monitor for  symptoms         CVA (cerebral vascular accident) (CMS/[facility]) - Primary     Monitor for weakness and changes in speech         GERD (gastroesophageal reflux disease)     Monitor GI symptoms          Medications, treatments, and labs reviewed  Continue medications and treatments as listed in EMR    Scribe Attestation  Sabrina MARQUES Scribe   attest that this documentation has been prepared under the direction and in the presence of Papo Dupont MD    Provider Attestation - Scribe documentation  All medical record entries made by the Scribe were at my direction and personally dictated by me. I have reviewed the chart and agree that the record accurately reflects my personal performance of the history, physical exam, discussion and plan.   Papo Dupont MD            Electronically Signed By: Papo Dupont MD   3/2/24  8:28 AM   No

## 2024-03-01 NOTE — PROGRESS NOTES
PROGRESS NOTE    Subjective   Chief complaint: Hernando Minor is a 73 y.o. male who is a long term care patient being seen and evaluated for monthly general medical care and follow-up    HPI:  HPI  Patient presents for general medical care and f/u.  Patient seen and examined at bedside.  No issues per nursing.  Patient has no acute complaints.  Patient requires assistance with ADLs.  HTN BP at goal.  Denies chest pain and headache.  Hx CVA, denies changes in weakness and speech.  BPH stable, denies difficulty voiding, urinary frequency, and weak stream.  GERD controlled.  Denies heartburn, regurgitation, epigastric discomfort, sour taste, and cough.  Patient with diagnosis of depression.  Mood is stable.  Denies feeling down and thoughts of harming self or others.  No acute distress.    Objective   Vital signs: 126/76, 66    Physical Exam  Constitutional:       General: He is not in acute distress.  Eyes:      Extraocular Movements: Extraocular movements intact.   Cardiovascular:      Rate and Rhythm: Normal rate and regular rhythm.   Pulmonary:      Effort: Pulmonary effort is normal.      Breath sounds: Normal breath sounds.      Comments: Lungs clear   Abdominal:      General: Bowel sounds are normal.      Palpations: Abdomen is soft.   Musculoskeletal:      Cervical back: Neck supple.      Right lower leg: No edema.      Left lower leg: No edema.   Neurological:      Mental Status: He is alert.   Psychiatric:         Mood and Affect: Mood normal.         Behavior: Behavior is cooperative.         Assessment/Plan   Problem List Items Addressed This Visit       HTN (hypertension)     BP at goal, continue anihypertensives  Controlled  Monitor BP         Bipolar depression (CMS/[facility])     Mood stable  Depakote  Zoloft  Continue to monitor mood and behaviors         BPH (benign prostatic hyperplasia)     Stable  Continue Tamsulosin  Monitor for symptoms         CVA (cerebral vascular accident) (CMS/[facility]) - Primary      Monitor for weakness and changes in speech         GERD (gastroesophageal reflux disease)     Monitor GI symptoms          Medications, treatments, and labs reviewed  Continue medications and treatments as listed in EMR    Scribe Attestation  I, Anna Garcia   attest that this documentation has been prepared under the direction and in the presence of Papo Dupont MD    Provider Attestation - Scribe documentation  All medical record entries made by the Scribe were at my direction and personally dictated by me. I have reviewed the chart and agree that the record accurately reflects my personal performance of the history, physical exam, discussion and plan.   Papo Dupont MD

## 2024-03-08 ENCOUNTER — NURSING HOME VISIT (OUTPATIENT)
Dept: POST ACUTE CARE | Facility: EXTERNAL LOCATION | Age: 74
End: 2024-03-08
Payer: MEDICAID

## 2024-03-08 VITALS
HEIGHT: 72 IN | DIASTOLIC BLOOD PRESSURE: 64 MMHG | BODY MASS INDEX: 31.8 KG/M2 | WEIGHT: 234.8 LBS | SYSTOLIC BLOOD PRESSURE: 121 MMHG

## 2024-03-08 DIAGNOSIS — F31.9 BIPOLAR DEPRESSION (MULTI): ICD-10-CM

## 2024-03-08 DIAGNOSIS — I63.9 CEREBROVASCULAR ACCIDENT (CVA), UNSPECIFIED MECHANISM (MULTI): ICD-10-CM

## 2024-03-08 DIAGNOSIS — K21.9 GASTROESOPHAGEAL REFLUX DISEASE, UNSPECIFIED WHETHER ESOPHAGITIS PRESENT: ICD-10-CM

## 2024-03-08 DIAGNOSIS — I10 HYPERTENSION, UNSPECIFIED TYPE: ICD-10-CM

## 2024-03-08 DIAGNOSIS — R33.8 BENIGN PROSTATIC HYPERPLASIA WITH URINARY RETENTION: ICD-10-CM

## 2024-03-08 DIAGNOSIS — N40.1 BENIGN PROSTATIC HYPERPLASIA WITH URINARY RETENTION: ICD-10-CM

## 2024-03-08 PROCEDURE — 99309 SBSQ NF CARE MODERATE MDM 30: CPT | Performed by: INTERNAL MEDICINE

## 2024-03-08 ASSESSMENT — ACTIVITIES OF DAILY LIVING (ADL)
GROCERY SHOPPING: TOTAL CARE
MANAGING FINANCES: TOTAL CARE
HEARING - LEFT EAR: FUNCTIONAL
EATING: INDEPENDENT
PATIENT'S MEMORY ADEQUATE TO SAFELY COMPLETE DAILY ACTIVITIES?: YES
USING TELEPHONE: INDEPENDENT
GROOMING: NEEDS ASSISTANCE
PILL BOX USED: NO
BATHING: DEPENDENT
DOING HOUSEWORK: TOTAL CARE
USING TRANSPORTATION: TOTAL CARE
HEARING - RIGHT EAR: FUNCTIONAL
PREPARING MEALS: TOTAL CARE
TOILETING: DEPENDENT
JUDGMENT_ADEQUATE_SAFELY_COMPLETE_DAILY_ACTIVITIES: YES
STIL DRIVING: NO
ADEQUATE_TO_COMPLETE_ADL: YES
ASSISTIVE_DEVICE: WHEELCHAIR
FEEDING YOURSELF: INDEPENDENT
TAKING MEDICATION: TOTAL CARE
WALKS IN HOME: DEPENDENT
DRESSING YOURSELF: DEPENDENT
NEEDS ASSISTANCE WITH FOOD: INDEPENDENT

## 2024-03-08 ASSESSMENT — PATIENT HEALTH QUESTIONNAIRE - PHQ9
SUM OF ALL RESPONSES TO PHQ9 QUESTIONS 1 AND 2: 0
1. LITTLE INTEREST OR PLEASURE IN DOING THINGS: NOT AT ALL
2. FEELING DOWN, DEPRESSED OR HOPELESS: NOT AT ALL

## 2024-03-08 ASSESSMENT — GERIATRIC MINI NUTRITIONAL ASSESSMENT (MNA)
E NEUROPSYCHOLOGICAL PROBLEMS: MILD DEMENTIA
A HAS FOOD INTAKE DECLINED OVER THE PAST 3 MONTHS DUE TO LOSS OF APPETITE, DIGESTIVE PROBLEMS, CHEWING OR SWALLOWING DIFFICULTIES?: NO DECREASE IN FOOD INTAKE
C GENERAL MOBILITY: BED OR CHAIR BOUND
B WEIGHT LOSS DURING THE LAST 3 MONTHS: NO WEIGHT LOSS
D HAS SUFFERED PSYCHOLOGICAL STRESS OR ACUTE DISEASE IN THE PAST 3 MONTHS?: NO

## 2024-03-08 ASSESSMENT — ENCOUNTER SYMPTOMS
DEPRESSION: 0
OCCASIONAL FEELINGS OF UNSTEADINESS: 1
LOSS OF SENSATION IN FEET: 1

## 2024-03-08 ASSESSMENT — ANXIETY QUESTIONNAIRES
2. NOT BEING ABLE TO STOP OR CONTROL WORRYING: NOT AT ALL
7. FEELING AFRAID AS IF SOMETHING AWFUL MIGHT HAPPEN: NOT AT ALL
6. BECOMING EASILY ANNOYED OR IRRITABLE: NOT AT ALL
3. WORRYING TOO MUCH ABOUT DIFFERENT THINGS: NOT AT ALL
5. BEING SO RESTLESS THAT IT IS HARD TO SIT STILL: NOT AT ALL
1. FEELING NERVOUS, ANXIOUS, OR ON EDGE: NOT AT ALL
4. TROUBLE RELAXING: NOT AT ALL
GAD7 TOTAL SCORE: 0

## 2024-03-08 ASSESSMENT — COLUMBIA-SUICIDE SEVERITY RATING SCALE - C-SSRS
6. HAVE YOU EVER DONE ANYTHING, STARTED TO DO ANYTHING, OR PREPARED TO DO ANYTHING TO END YOUR LIFE?: NO
2. HAVE YOU ACTUALLY HAD ANY THOUGHTS OF KILLING YOURSELF?: NO
1. IN THE PAST MONTH, HAVE YOU WISHED YOU WERE DEAD OR WISHED YOU COULD GO TO SLEEP AND NOT WAKE UP?: NO

## 2024-03-08 NOTE — LETTER
Patient: Hernando Minor  : 1950    Encounter Date: 2024    ANNUAL WELLNESS VISIT    Subjective:  Chief Complaint: Hernando Minor is an 73 y.o.   Non- male who presents for annual wellness visit, general medical care, and follow-up chronic conditions.    HPI:  HPI Patient seen and examined at bedside for annual wellness visit and physical exam. Patient has no new issues at this time. He is feeling well and has no acute distress. According to nursing staff he is stable and without complaints.     Past Medical History:   Diagnosis Date   • Anxiety    • Bipolar 1 disorder (CMS/[facility])    • BPH (benign prostatic hyperplasia)    • Dementia (CMS/[facility])    • Depression    • DM (diabetes mellitus) (CMS/[facility])    • HTN (hypertension)    • OA (ocular albinism) (CMS/[facility])    • Sleep apnea    • Urinary retention    • Vitamin D deficiency        Past Surgical History:   Procedure Laterality Date   • HIP ARTHROPLASTY Left        Family History   Problem Relation Name Age of Onset   • Heart attack Father         Social History     Socioeconomic History   • Marital status:      Spouse name: None   • Number of children: None   • Years of education: None   • Highest education level: None   Occupational History   • None   Tobacco Use   • Smoking status: Former     Types: Cigarettes   • Smokeless tobacco: Never   Vaping Use   • Vaping Use: Never used   Substance and Sexual Activity   • Alcohol use: Not Currently   • Drug use: Never   • Sexual activity: Not Currently   Other Topics Concern   • None   Social History Narrative   • None     Social Determinants of Health     Financial Resource Strain: Not on file   Food Insecurity: Not on file   Transportation Needs: Not on file   Physical Activity: Not on file   Stress: Not on file   Social Connections: Not on file   Intimate Partner Violence: Not on file   Housing Stability: Not on file       Objective  /64   Ht 1.829 m (6')   Wt 107 kg (234 lb 12.8  "oz)   BMI 31.84 kg/m²     Physical Exam  Constitutional:       General: He is not in acute distress.     Appearance: He is obese.   Eyes:      Extraocular Movements: Extraocular movements intact.   Cardiovascular:      Rate and Rhythm: Normal rate and regular rhythm.   Pulmonary:      Effort: Pulmonary effort is normal.      Breath sounds: Normal breath sounds.   Abdominal:      General: Bowel sounds are normal.      Palpations: Abdomen is soft.   Musculoskeletal:         General: Normal range of motion.      Cervical back: Normal range of motion and neck supple.      Right lower leg: No edema.      Left lower leg: No edema.   Skin:     General: Skin is warm and dry.   Neurological:      General: No focal deficit present.      Mental Status: He is alert and oriented to person, place, and time. Mental status is at baseline.   Psychiatric:         Mood and Affect: Mood normal.         Behavior: Behavior normal. Behavior is cooperative.         Thought Content: Thought content normal.         Judgment: Judgment normal.         Imaging:  No results found.     Labs reviewed:    No results found for: \"WBC\", \"HGB\", \"HCT\", \"PLT\", \"CHOL\", \"TRIG\", \"HDL\", \"LDLDIRECT\", \"ALT\", \"AST\", \"NA\", \"K\", \"CL\", \"CREATININE\", \"BUN\", \"CO2\", \"TSH\", \"PSA\", \"INR\", \"GLUF\", \"HGBA1C\", \"ALBUR\"    Past Medical, Surgical, and Family History reviewed and updated in chart  Allergies reviewed and updated in facility record  All medications and treatments were reviewed in the local facility record  Nursing assessments/notes were reviewed in the local facility record    List of current healthcare providers:  Patient Care Team:  Papo Dupont MD as PCP - General     HRA:  Over the past 2 weeks, how often have you been bothered by any of the following problems?  Little interest or pleasure in doing things: Not at all  Feeling down, depressed, or hopeless: Not at all  Patient Health Questionnaire-2 Score: 0    Steadi Fall Risk  One or more falls in the " last year? No  How many Times?    Was the patient injured in the fall?    Has trouble stepping onto curb? Yes  Advised to use a cane or walker to get around safely? No  Comment:uses WC for mobility  Often has to rush to toilet? No  Feels unsteady when walking? Yes  Has lost some feeling in feet? Yes  Often feels sad or depressed? No  Steadies self on furniture while walking at home? No  Takes medicine that makes them feel lightheaded or more tired than usual? Yes  Worried about Falling? No  Takes medicine to sleep or improve mood? Yes  Needs to push with hands when rising from a chair? Yes                                ADL Screening  Hearing - Right Ear: Functional  Hearing - Left Ear: Functional  Bathing: Dependent  Dressing: Dependent  Walks in Home: Dependent    IADL's  Managing Finances: Total care  Grocery Shopping: Total care  Taking Medication: Total care  Doing Housework: Total care    Psychosocial risks:  Do you feel sad - no  Do you feel stressed - no  Do you feel lonely - no  Do you feel fatigued - no  Do you have any pain - no    Assessment/Plan:  Problem List Items Addressed This Visit       HTN (hypertension)     BP at goal, continue anihypertensives  Controlled  Monitor BP         Bipolar depression (CMS/HCC)     Mood stable  Depakote  Zoloft  Continue to monitor mood and behaviors         BPH (benign prostatic hyperplasia)     Stable  Continue Tamsulosin  Monitor for symptoms         CVA (cerebral vascular accident) (CMS/HCC)     Monitor for weakness and changes in speech         GERD (gastroesophageal reflux disease)     Monitor GI symptoms          The following health maintenance schedule was reviewed with the patient and provided in printed form in the after visit summary:  Health Maintenance   Topic Date Due   • COVID-19 Vaccine (1) Never done   • Yearly Adult Physical  03/09/2025   • Lipid Panel  03/08/2029   • Influenza Vaccine  Addressed   • HIB Vaccines  Aged Out   • Hepatitis B Vaccines   Aged Out   • IPV Vaccines  Aged Out   • Hepatitis A Vaccines  Aged Out   • Meningococcal Vaccine  Aged Out   • Rotavirus Vaccines  Aged Out   • HPV Vaccines  Aged Out   • DTaP/Tdap/Td Vaccines  Discontinued   • Abdominal Aortic Aneurysm (AAA) Screening  Discontinued   • Pneumococcal Vaccine: 65+ Years  Discontinued   • Zoster Vaccines  Discontinued   • Hepatitis C Screening  Discontinued   • Colorectal Cancer Screening  Discontinued     Will obtain the following labs at nursing facility:  BMP, CBC, LIPID PANEL, and A1C     Continue current medications as listed in facility EMR  Time spent 30 minutes (21846)     Scribe Attestation  I, Anna Mejia   attest that this documentation has been prepared under the direction and in the presence of Papo Dupont MD.    Provider Attestation - Scribe documentation  All medical record entries made by the Scribe were at my direction and personally dictated by me. I have reviewed the chart and agree that the record accurately reflects my personal performance of the history, physical exam, discussion and plan.        Electronically Signed By: Papo Dupont MD   3/9/24  5:10 PM

## 2024-03-08 NOTE — PROGRESS NOTES
ANNUAL WELLNESS VISIT    Subjective :  Chief Complaint: Hernando Minor is an 73 y.o.   Non- male who presents for annual wellness visit, general medical care, and follow-up chronic conditions.    HPI:  HPI Patient seen and examined at bedside for annual wellness visit and physical exam. Patient has no new issues at this time. He is feeling well and has no acute distress. According to nursing staff he is stable and without complaints.     Past Medical History:   Diagnosis Date    Anxiety     Bipolar 1 disorder (CMS/[facility])     BPH (benign prostatic hyperplasia)     Dementia (CMS/[facility])     Depression     DM (diabetes mellitus) (CMS/[facility])     HTN (hypertension)     OA (ocular albinism) (CMS/[facility])     Sleep apnea     Urinary retention     Vitamin D deficiency        Past Surgical History:   Procedure Laterality Date    HIP ARTHROPLASTY Left        Family History   Problem Relation Name Age of Onset    Heart attack Father         Social History     Socioeconomic History    Marital status:      Spouse name: None    Number of children: None    Years of education: None    Highest education level: None   Occupational History    None   Tobacco Use    Smoking status: Former     Types: Cigarettes    Smokeless tobacco: Never   Vaping Use    Vaping Use: Never used   Substance and Sexual Activity    Alcohol use: Not Currently    Drug use: Never    Sexual activity: Not Currently   Other Topics Concern    None   Social History Narrative    None     Social Determinants of Health     Financial Resource Strain: Not on file   Food Insecurity: Not on file   Transportation Needs: Not on file   Physical Activity: Not on file   Stress: Not on file   Social Connections: Not on file   Intimate Partner Violence: Not on file   Housing Stability: Not on file       Objective   /64   Ht 1.829 m (6')   Wt 107 kg (234 lb 12.8 oz)   BMI 31.84 kg/m²     Physical Exam  Constitutional:       General: He is not in acute  "distress.     Appearance: He is obese.   Eyes:      Extraocular Movements: Extraocular movements intact.   Cardiovascular:      Rate and Rhythm: Normal rate and regular rhythm.   Pulmonary:      Effort: Pulmonary effort is normal.      Breath sounds: Normal breath sounds.   Abdominal:      General: Bowel sounds are normal.      Palpations: Abdomen is soft.   Musculoskeletal:         General: Normal range of motion.      Cervical back: Normal range of motion and neck supple.      Right lower leg: No edema.      Left lower leg: No edema.   Skin:     General: Skin is warm and dry.   Neurological:      General: No focal deficit present.      Mental Status: He is alert and oriented to person, place, and time. Mental status is at baseline.   Psychiatric:         Mood and Affect: Mood normal.         Behavior: Behavior normal. Behavior is cooperative.         Thought Content: Thought content normal.         Judgment: Judgment normal.         Imaging:  No results found.     Labs reviewed:    No results found for: \"WBC\", \"HGB\", \"HCT\", \"PLT\", \"CHOL\", \"TRIG\", \"HDL\", \"LDLDIRECT\", \"ALT\", \"AST\", \"NA\", \"K\", \"CL\", \"CREATININE\", \"BUN\", \"CO2\", \"TSH\", \"PSA\", \"INR\", \"GLUF\", \"HGBA1C\", \"ALBUR\"    Past Medical, Surgical, and Family History reviewed and updated in chart  Allergies reviewed and updated in facility record  All medications and treatments were reviewed in the local facility record  Nursing assessments/notes were reviewed in the local facility record    List of current healthcare providers:  Patient Care Team:  Papo Dupont MD as PCP - General     HRA:  Over the past 2 weeks, how often have you been bothered by any of the following problems?  Little interest or pleasure in doing things: Not at all  Feeling down, depressed, or hopeless: Not at all  Patient Health Questionnaire-2 Score: 0    Steadi Fall Risk  One or more falls in the last year? No  How many Times?    Was the patient injured in the fall?    Has trouble stepping " onto curb? Yes  Advised to use a cane or walker to get around safely? No  Comment:uses WC for mobility  Often has to rush to toilet? No  Feels unsteady when walking? Yes  Has lost some feeling in feet? Yes  Often feels sad or depressed? No  Steadies self on furniture while walking at home? No  Takes medicine that makes them feel lightheaded or more tired than usual? Yes  Worried about Falling? No  Takes medicine to sleep or improve mood? Yes  Needs to push with hands when rising from a chair? Yes                                ADL Screening  Hearing - Right Ear: Functional  Hearing - Left Ear: Functional  Bathing: Dependent  Dressing: Dependent  Walks in Home: Dependent    IADL's  Managing Finances: Total care  Grocery Shopping: Total care  Taking Medication: Total care  Doing Housework: Total care    Psychosocial risks:  Do you feel sad - no  Do you feel stressed - no  Do you feel lonely - no  Do you feel fatigued - no  Do you have any pain - no    Assessment/Plan :  Problem List Items Addressed This Visit       HTN (hypertension)     BP at goal, continue anihypertensives  Controlled  Monitor BP         Bipolar depression (CMS/[facility])     Mood stable  Depakote  Zoloft  Continue to monitor mood and behaviors         BPH (benign prostatic hyperplasia)     Stable  Continue Tamsulosin  Monitor for symptoms         CVA (cerebral vascular accident) (CMS/[facility])     Monitor for weakness and changes in speech         GERD (gastroesophageal reflux disease)     Monitor GI symptoms          The following health maintenance schedule was reviewed with the patient and provided in printed form in the after visit summary:  Health Maintenance   Topic Date Due    COVID-19 Vaccine (1) Never done    Yearly Adult Physical  03/09/2025    Lipid Panel  03/08/2029    Influenza Vaccine  Addressed    HIB Vaccines  Aged Out    Hepatitis B Vaccines  Aged Out    IPV Vaccines  Aged Out    Hepatitis A Vaccines  Aged Out    Meningococcal Vaccine  Aged  Out    Rotavirus Vaccines  Aged Out    HPV Vaccines  Aged Out    DTaP/Tdap/Td Vaccines  Discontinued    Abdominal Aortic Aneurysm (AAA) Screening  Discontinued    Pneumococcal Vaccine: 65+ Years  Discontinued    Zoster Vaccines  Discontinued    Hepatitis C Screening  Discontinued    Colorectal Cancer Screening  Discontinued     Will obtain the following labs at nursing facility:  BMP, CBC, LIPID PANEL, and A1C     Continue current medications as listed in facility EMR  Time spent 30 minutes (17407)     Scribe Attestation  I, Anna Mejia   attest that this documentation has been prepared under the direction and in the presence of Papo Dupont MD.    Provider Attestation - Scribe documentation  All medical record entries made by the Scribe were at my direction and personally dictated by me. I have reviewed the chart and agree that the record accurately reflects my personal performance of the history, physical exam, discussion and plan.

## 2024-04-03 ENCOUNTER — NURSING HOME VISIT (OUTPATIENT)
Dept: POST ACUTE CARE | Facility: EXTERNAL LOCATION | Age: 74
End: 2024-04-03
Payer: MEDICAID

## 2024-04-03 DIAGNOSIS — I63.9 CEREBROVASCULAR ACCIDENT (CVA), UNSPECIFIED MECHANISM (MULTI): Primary | ICD-10-CM

## 2024-04-03 DIAGNOSIS — I10 HYPERTENSION, UNSPECIFIED TYPE: ICD-10-CM

## 2024-04-03 DIAGNOSIS — R33.8 BENIGN PROSTATIC HYPERPLASIA WITH URINARY RETENTION: ICD-10-CM

## 2024-04-03 DIAGNOSIS — K21.9 GASTROESOPHAGEAL REFLUX DISEASE, UNSPECIFIED WHETHER ESOPHAGITIS PRESENT: ICD-10-CM

## 2024-04-03 DIAGNOSIS — N40.1 BENIGN PROSTATIC HYPERPLASIA WITH URINARY RETENTION: ICD-10-CM

## 2024-04-03 PROCEDURE — 99309 SBSQ NF CARE MODERATE MDM 30: CPT | Performed by: INTERNAL MEDICINE

## 2024-04-03 NOTE — LETTER
Patient: eHrnando Minor  : 1950    Encounter Date: 2024    PROGRESS NOTE    Subjective  Chief complaint: Hernando Minor is a 73 y.o. male who is a long term care patient being seen and evaluated for monthly general medical care and follow-up    HPI:  HPI  Patient presents for general medical care and f/u.  Patient seen and examined at bedside.  No issues per nursing.  Patient has no acute complaints. HTN BP at goal. Denies chest pain and headache. Hx CVA, denies changes in weakness and speech. BPH stable, denies difficulty voiding, urinary frequency, and weak stream. GERD controlled. Denies heartburn, regurgitation, epigastric discomfort, sour taste, and cough. Patient with diagnosis of depression. Mood is stable. Denies feeling down and thoughts of harming self or others. Mentation at baseline, no acute distress.     Objective  Vital signs: 147/72, 62,     Physical Exam  Constitutional:       General: He is not in acute distress.     Appearance: He is obese.   Eyes:      Extraocular Movements: Extraocular movements intact.   Cardiovascular:      Rate and Rhythm: Normal rate and regular rhythm.   Pulmonary:      Effort: Pulmonary effort is normal.      Breath sounds: Normal breath sounds.   Abdominal:      General: Bowel sounds are normal.      Palpations: Abdomen is soft.   Musculoskeletal:         General: Normal range of motion.      Cervical back: Normal range of motion and neck supple.      Right lower leg: No edema.      Left lower leg: No edema.   Skin:     General: Skin is warm and dry.   Neurological:      Mental Status: He is alert.   Psychiatric:         Behavior: Behavior is cooperative.         Assessment/Plan  Problem List Items Addressed This Visit       HTN (hypertension)     BP at goal, continue anihypertensives  Controlled  Monitor BP         BPH (benign prostatic hyperplasia)     Stable  Continue Tamsulosin  Monitor for symptoms         CVA (cerebral vascular accident) (CMS/[facility]) -  Primary     Monitor for weakness and changes in speech         GERD (gastroesophageal reflux disease)     Monitor GI symptoms          Medications, treatments, and labs reviewed  Continue medications and treatments as listed in EMR    Scribe Attestation  I, Anna Garcia   attest that this documentation has been prepared under the direction and in the presence of Papo Dupont MD    Provider Attestation - Scribe documentation  All medical record entries made by the Scribe were at my direction and personally dictated by me. I have reviewed the chart and agree that the record accurately reflects my personal performance of the history, physical exam, discussion and plan.   Papo Dupont MD            Electronically Signed By: Papo Dupont MD   4/3/24  4:10 PM

## 2024-04-03 NOTE — PROGRESS NOTES
PROGRESS NOTE    Subjective   Chief complaint: Hernando Minor is a 73 y.o. male who is a long term care patient being seen and evaluated for monthly general medical care and follow-up    HPI:  HPI  Patient presents for general medical care and f/u.  Patient seen and examined at bedside.  No issues per nursing.  Patient has no acute complaints. HTN BP at goal. Denies chest pain and headache. Hx CVA, denies changes in weakness and speech. BPH stable, denies difficulty voiding, urinary frequency, and weak stream. GERD controlled. Denies heartburn, regurgitation, epigastric discomfort, sour taste, and cough. Patient with diagnosis of depression. Mood is stable. Denies feeling down and thoughts of harming self or others. Mentation at baseline, no acute distress.     Objective   Vital signs: 147/72, 62,     Physical Exam  Constitutional:       General: He is not in acute distress.     Appearance: He is obese.   Eyes:      Extraocular Movements: Extraocular movements intact.   Cardiovascular:      Rate and Rhythm: Normal rate and regular rhythm.   Pulmonary:      Effort: Pulmonary effort is normal.      Breath sounds: Normal breath sounds.   Abdominal:      General: Bowel sounds are normal.      Palpations: Abdomen is soft.   Musculoskeletal:         General: Normal range of motion.      Cervical back: Normal range of motion and neck supple.      Right lower leg: No edema.      Left lower leg: No edema.   Skin:     General: Skin is warm and dry.   Neurological:      Mental Status: He is alert.   Psychiatric:         Behavior: Behavior is cooperative.         Assessment/Plan   Problem List Items Addressed This Visit       HTN (hypertension)     BP at goal, continue anihypertensives  Controlled  Monitor BP         BPH (benign prostatic hyperplasia)     Stable  Continue Tamsulosin  Monitor for symptoms         CVA (cerebral vascular accident) (CMS/[facility]) - Primary     Monitor for weakness and changes in speech         GERD  (gastroesophageal reflux disease)     Monitor GI symptoms          Medications, treatments, and labs reviewed  Continue medications and treatments as listed in EMR    Scribe Attestation  I, Anna Garcia   attest that this documentation has been prepared under the direction and in the presence of Papo Dupont MD    Provider Attestation - Scribe documentation  All medical record entries made by the Scribe were at my direction and personally dictated by me. I have reviewed the chart and agree that the record accurately reflects my personal performance of the history, physical exam, discussion and plan.   Papo Dupont MD

## 2024-05-11 ENCOUNTER — NURSING HOME VISIT (OUTPATIENT)
Dept: POST ACUTE CARE | Facility: EXTERNAL LOCATION | Age: 74
End: 2024-05-11
Payer: MEDICAID

## 2024-05-11 DIAGNOSIS — F31.9 BIPOLAR DEPRESSION (MULTI): ICD-10-CM

## 2024-05-11 DIAGNOSIS — I10 HYPERTENSION, UNSPECIFIED TYPE: ICD-10-CM

## 2024-05-11 DIAGNOSIS — I63.9 CEREBROVASCULAR ACCIDENT (CVA), UNSPECIFIED MECHANISM (MULTI): ICD-10-CM

## 2024-05-11 PROCEDURE — 99309 SBSQ NF CARE MODERATE MDM 30: CPT | Performed by: INTERNAL MEDICINE

## 2024-05-11 NOTE — LETTER
Patient: Hernando Minor  : 1950    Encounter Date: 2024    PROGRESS NOTE    Subjective  Chief complaint: Hernando Minor is a 73 y.o. male who is a long term care patient being seen and evaluated for monthly general medical care and follow-up    HPI:  Patient presents for general medical care and f/u.  Patient seen and examined at bedside.  No issues per nursing.  Patient has no acute complaints.  HTN BP at goal.  Denies chest pain and headache.  BPH stable, denies difficulty voiding, urinary frequency, and weak stream.  Patient with diagnosis of depression.  Mood is stable.  Denies feeling down and thoughts of harming self or others. No acute distress.         Objective  Vital signs: 116/62,66,97%,     Physical Exam  Constitutional:       General: He is not in acute distress.     Appearance: He is obese.   Eyes:      Extraocular Movements: Extraocular movements intact.   Cardiovascular:      Rate and Rhythm: Normal rate and regular rhythm.   Pulmonary:      Effort: Pulmonary effort is normal.      Breath sounds: Normal breath sounds.   Abdominal:      General: Bowel sounds are normal.      Palpations: Abdomen is soft.   Musculoskeletal:         General: Normal range of motion.      Cervical back: Normal range of motion and neck supple.      Right lower leg: No edema.      Left lower leg: No edema.   Skin:     General: Skin is warm and dry.   Neurological:      Mental Status: He is alert.   Psychiatric:         Behavior: Behavior is cooperative.         Assessment/Plan  Problem List Items Addressed This Visit       HTN (hypertension)     BP at goal, continue anihypertensives  Controlled  Monitor BP         Bipolar depression ([facility])     Mood stable  Depakote  Zoloft  Continue to monitor mood and behaviors         CVA (cerebral vascular accident) (Multi)     Monitor for weakness and changes in speech          Medications, treatments, and labs reviewed  Continue medications and treatments as listed in  EMR      Scribe Attestation  I, Anna Davis   attest that this documentation has been prepared under the direction and in the presence of Papo Dupont MD.     Provider Attestation - Scribe documentation  All medical record entries made by the Scribe were at my direction and personally dictated by me. I have reviewed the chart and agree that the record accurately reflects my personal performance of the history, physical exam, discussion and plan.   Papo Dupont MD      Electronically Signed By: Papo Dupont MD   5/13/24  1:22 PM

## 2024-05-13 NOTE — PROGRESS NOTES
PROGRESS NOTE    Subjective   Chief complaint: Hernando Minor is a 73 y.o. male who is a long term care patient being seen and evaluated for monthly general medical care and follow-up    HPI:  Patient presents for general medical care and f/u.  Patient seen and examined at bedside.  No issues per nursing.  Patient has no acute complaints.  HTN BP at goal.  Denies chest pain and headache.  BPH stable, denies difficulty voiding, urinary frequency, and weak stream.  Patient with diagnosis of depression.  Mood is stable.  Denies feeling down and thoughts of harming self or others. No acute distress.         Objective   Vital signs: 116/62,66,97%,     Physical Exam  Constitutional:       General: He is not in acute distress.     Appearance: He is obese.   Eyes:      Extraocular Movements: Extraocular movements intact.   Cardiovascular:      Rate and Rhythm: Normal rate and regular rhythm.   Pulmonary:      Effort: Pulmonary effort is normal.      Breath sounds: Normal breath sounds.   Abdominal:      General: Bowel sounds are normal.      Palpations: Abdomen is soft.   Musculoskeletal:         General: Normal range of motion.      Cervical back: Normal range of motion and neck supple.      Right lower leg: No edema.      Left lower leg: No edema.   Skin:     General: Skin is warm and dry.   Neurological:      Mental Status: He is alert.   Psychiatric:         Behavior: Behavior is cooperative.         Assessment/Plan   Problem List Items Addressed This Visit       HTN (hypertension)     BP at goal, continue anihypertensives  Controlled  Monitor BP         Bipolar depression (Multi)     Mood stable  Depakote  Zoloft  Continue to monitor mood and behaviors         CVA (cerebral vascular accident) (Multi)     Monitor for weakness and changes in speech          Medications, treatments, and labs reviewed  Continue medications and treatments as listed in EMR      Gordonibe Attestation  Charlene MARQUES Scribe attest  that this documentation has been prepared under the direction and in the presence of Papo Dupont MD.     Provider Attestation - Scribe documentation  All medical record entries made by the Scribe were at my direction and personally dictated by me. I have reviewed the chart and agree that the record accurately reflects my personal performance of the history, physical exam, discussion and plan.   Papo Dupont MD     Burow's Advancement Flap Text: Given the location of the defect, inherent tension at the surgical site, and the proximity to free margins a Burow’s advancement flap was deemed most appropriate for wound reconstruction. The risks, benefits, and possible outcomes of this procedure were discussed along with the risks, benefits, and possible outcomes of other options for wound repair (including but not limited to: complex closure, other flaps, grafts, and second intention). The patient verbalized understanding and consent to the outlined procedure. The patient verbalized understanding that intraoperative conditions may necessitate a change in the outlined procedure resulting in modification of the original surgical plan. This decision may be made at the discretion of the surgeon, and is due to factors subject to change or that are difficult to predict preoperatively. Using a sterile surgical marker, an appropriate Burow’s advancement flap was drawn incorporating the defect and placing the expected incisions within the relaxed skin tension lines where possible. The surgical site and surrounding skin were prepped and draped in sterile fashion.  Standing cones were removed from opposite ends of the defect within the appropriate surgical plane, repositioning as necessary based upon local tension, proximity to free margins/other anatomic structures, and position of the relaxed skin tension lines. The position of one dog ear was modified, moving the standing cone along a lateral plane to lie in a more favorable location for closure.  The resulting defect was undermined widely and bilaterally in the appropriate surgical plane taking care to preserve local arteries, veins, nerves, and other structures of anatomic importance. This created a sliding flap capable of advancing across the defect to close the wound under minimal tension. Hemostasis was achieved and then the wound was sutured in layered fashion.

## 2024-06-12 ENCOUNTER — NURSING HOME VISIT (OUTPATIENT)
Dept: POST ACUTE CARE | Facility: EXTERNAL LOCATION | Age: 74
End: 2024-06-12
Payer: MEDICAID

## 2024-06-12 DIAGNOSIS — I63.9 CEREBROVASCULAR ACCIDENT (CVA), UNSPECIFIED MECHANISM (MULTI): ICD-10-CM

## 2024-06-12 DIAGNOSIS — I10 HYPERTENSION, UNSPECIFIED TYPE: ICD-10-CM

## 2024-06-12 DIAGNOSIS — R33.8 BENIGN PROSTATIC HYPERPLASIA WITH URINARY RETENTION: Primary | ICD-10-CM

## 2024-06-12 DIAGNOSIS — N40.1 BENIGN PROSTATIC HYPERPLASIA WITH URINARY RETENTION: Primary | ICD-10-CM

## 2024-06-12 DIAGNOSIS — F31.9 BIPOLAR DEPRESSION (MULTI): ICD-10-CM

## 2024-06-12 DIAGNOSIS — K21.9 GASTROESOPHAGEAL REFLUX DISEASE, UNSPECIFIED WHETHER ESOPHAGITIS PRESENT: ICD-10-CM

## 2024-06-12 PROCEDURE — 99309 SBSQ NF CARE MODERATE MDM 30: CPT | Performed by: INTERNAL MEDICINE

## 2024-06-12 NOTE — LETTER
Patient: Hernando Minor  : 1950    Encounter Date: 2024    PROGRESS NOTE    Subjective  Chief complaint: Hernando Minor is a 73 y.o. male who is a long term care patient being seen and evaluated for monthly general medical care and follow-up    HPI:  HPI  Patient presents for general medical care and f/u.  Patient seen and examined at bedside.  No issues per nursing.  Patient has no acute complaints.  Patient has dementia and requires assist with ADLs.  HTN BP at goal.  Denies chest pain and headache.  Hx CVA, denies changes in weakness and speech.  GERD controlled.  Denies heartburn, regurgitation, epigastric discomfort, sour taste, and cough.  BPH stable, denies difficulty voiding, urinary frequency, and weak stream.  Patient with diagnosis of depression.  Mood is stable.  Denies feeling down and thoughts of harming self or others.  No acute distress.  Mentation at baseline.    Objective  Vital signs: 126/69, 58, 95%    Physical Exam  Constitutional:       General: He is not in acute distress.     Appearance: He is obese.   Eyes:      Extraocular Movements: Extraocular movements intact.   Cardiovascular:      Rate and Rhythm: Normal rate and regular rhythm.   Pulmonary:      Effort: Pulmonary effort is normal.      Breath sounds: Normal breath sounds.   Abdominal:      General: Bowel sounds are normal.      Palpations: Abdomen is soft.   Musculoskeletal:         General: Normal range of motion.      Cervical back: Normal range of motion and neck supple.      Right lower leg: No edema.      Left lower leg: No edema.   Skin:     General: Skin is warm and dry.   Neurological:      Mental Status: He is alert.   Psychiatric:         Behavior: Behavior is cooperative.         Assessment/Plan  Problem List Items Addressed This Visit       HTN (hypertension)     BP at goal, continue anihypertensives  Controlled  Monitor BP         Bipolar depression (Multi)     Mood stable  Depakote  Zoloft  Continue to monitor  mood and behaviors         BPH (benign prostatic hyperplasia) - Primary     Stable  Continue Tamsulosin  Monitor for symptoms         CVA (cerebral vascular accident) (Multi)     Monitor for weakness and changes in speech         GERD (gastroesophageal reflux disease)     Monitor GI symptoms          Medications, treatments, and labs reviewed  Continue medications and treatments as listed in EMR    Scribe Attestation  Sabrina MARQUES Scribe   attest that this documentation has been prepared under the direction and in the presence of Papo Dupont MD    Provider Attestation - Scribe documentation  All medical record entries made by the Scribe were at my direction and personally dictated by me. I have reviewed the chart and agree that the record accurately reflects my personal performance of the history, physical exam, discussion and plan.   Papo Dupont MD            Electronically Signed By: Papo Dupont MD   6/12/24  1:19 PM

## 2024-06-12 NOTE — PROGRESS NOTES
PROGRESS NOTE    Subjective   Chief complaint: Hernando Minor is a 73 y.o. male who is a long term care patient being seen and evaluated for monthly general medical care and follow-up    HPI:  HPI  Patient presents for general medical care and f/u.  Patient seen and examined at bedside.  No issues per nursing.  Patient has no acute complaints.  Patient has dementia and requires assist with ADLs.  HTN BP at goal.  Denies chest pain and headache.  Hx CVA, denies changes in weakness and speech.  GERD controlled.  Denies heartburn, regurgitation, epigastric discomfort, sour taste, and cough.  BPH stable, denies difficulty voiding, urinary frequency, and weak stream.  Patient with diagnosis of depression.  Mood is stable.  Denies feeling down and thoughts of harming self or others.  No acute distress.  Mentation at baseline.    Objective   Vital signs: 126/69, 58, 95%    Physical Exam  Constitutional:       General: He is not in acute distress.     Appearance: He is obese.   Eyes:      Extraocular Movements: Extraocular movements intact.   Cardiovascular:      Rate and Rhythm: Normal rate and regular rhythm.   Pulmonary:      Effort: Pulmonary effort is normal.      Breath sounds: Normal breath sounds.   Abdominal:      General: Bowel sounds are normal.      Palpations: Abdomen is soft.   Musculoskeletal:         General: Normal range of motion.      Cervical back: Normal range of motion and neck supple.      Right lower leg: No edema.      Left lower leg: No edema.   Skin:     General: Skin is warm and dry.   Neurological:      Mental Status: He is alert.   Psychiatric:         Behavior: Behavior is cooperative.         Assessment/Plan   Problem List Items Addressed This Visit       HTN (hypertension)     BP at goal, continue anihypertensives  Controlled  Monitor BP         Bipolar depression (Multi)     Mood stable  Depakote  Zoloft  Continue to monitor mood and behaviors         BPH (benign prostatic hyperplasia) -  Primary     Stable  Continue Tamsulosin  Monitor for symptoms         CVA (cerebral vascular accident) (Multi)     Monitor for weakness and changes in speech         GERD (gastroesophageal reflux disease)     Monitor GI symptoms          Medications, treatments, and labs reviewed  Continue medications and treatments as listed in EMR    Scribe Attestation  Sabrina MARQUES Scribe   attest that this documentation has been prepared under the direction and in the presence of Papo Dupont MD    Provider Attestation - Scribe documentation  All medical record entries made by the Scribe were at my direction and personally dictated by me. I have reviewed the chart and agree that the record accurately reflects my personal performance of the history, physical exam, discussion and plan.   Papo Dupont MD

## 2024-07-09 ENCOUNTER — NURSING HOME VISIT (OUTPATIENT)
Dept: POST ACUTE CARE | Facility: EXTERNAL LOCATION | Age: 74
End: 2024-07-09
Payer: MEDICAID

## 2024-07-09 DIAGNOSIS — I10 HYPERTENSION, UNSPECIFIED TYPE: Primary | ICD-10-CM

## 2024-07-09 DIAGNOSIS — K21.9 GASTROESOPHAGEAL REFLUX DISEASE, UNSPECIFIED WHETHER ESOPHAGITIS PRESENT: ICD-10-CM

## 2024-07-09 DIAGNOSIS — F31.9 BIPOLAR DEPRESSION (MULTI): ICD-10-CM

## 2024-07-09 NOTE — LETTER
Patient: Hernando Minor  : 1950    Encounter Date: 2024    PROGRESS NOTE    Subjective  Chief complaint: Hernando Minor is a 73 y.o. male who is a long term care patient being seen and evaluated for low blood pressure     HPI: reviewed current labs, hypernatremia evident. Reviewed medications and blood pressures. Systolic BP readings - low values in the afternoon.    Will discontinue hydrochlorothiazide. To repeat labs. BP to be monitored.   Pleasant and talkative. Playing cards with another patient.   HPI      Objective  Vital signs: 103/64-77-18-98.3     Physical Exam  Vitals and nursing note reviewed.   Constitutional:       General: He is not in acute distress.     Appearance: He is overweight.      Comments: Self propels in wheelchair    Eyes:      Extraocular Movements: Extraocular movements intact.   Cardiovascular:      Rate and Rhythm: Normal rate and regular rhythm.   Pulmonary:      Effort: Pulmonary effort is normal.      Breath sounds: Normal breath sounds.   Abdominal:      General: Bowel sounds are normal.      Palpations: Abdomen is soft.   Musculoskeletal:      Cervical back: Neck supple.      Right lower leg: No edema.      Left lower leg: No edema.   Neurological:      Mental Status: He is alert.   Psychiatric:         Mood and Affect: Mood normal.         Behavior: Behavior is cooperative.         Assessment/Plan  Problem List Items Addressed This Visit       HTN (hypertension) - Primary     continue anihypertensives  discontinue hydrochlorothiazide  Labs - hypernatremia    BP running low in afternoon hours    Denies any symptoms of hypotension     Monitor BP         Bipolar depression (Multi)     Mood stable  Depakote  Zoloft  Continue to monitor mood and behaviors         GERD (gastroesophageal reflux disease)     Monitor GI symptoms          Medications, treatments, and labs reviewed  Continue medications and treatments as listed in EMR    Jolanta Rainey,  GABBI-CNP      Electronically Signed By: NIKKI Cruz   7/13/24 11:56 AM

## 2024-07-10 NOTE — PROGRESS NOTES
PROGRESS NOTE    Subjective   Chief complaint: Hernando Minor is a 73 y.o. male who is a long term care patient being seen and evaluated for low blood pressure     HPI: reviewed current labs, hypernatremia evident. Reviewed medications and blood pressures. Systolic BP readings - low values in the afternoon.    Will discontinue hydrochlorothiazide. To repeat labs. BP to be monitored.   Pleasant and talkative. Playing cards with another patient.   HPI      Objective   Vital signs: 103/64-77-18-98.3     Physical Exam  Vitals and nursing note reviewed.   Constitutional:       General: He is not in acute distress.     Appearance: He is overweight.      Comments: Self propels in wheelchair    Eyes:      Extraocular Movements: Extraocular movements intact.   Cardiovascular:      Rate and Rhythm: Normal rate and regular rhythm.   Pulmonary:      Effort: Pulmonary effort is normal.      Breath sounds: Normal breath sounds.   Abdominal:      General: Bowel sounds are normal.      Palpations: Abdomen is soft.   Musculoskeletal:      Cervical back: Neck supple.      Right lower leg: No edema.      Left lower leg: No edema.   Neurological:      Mental Status: He is alert.   Psychiatric:         Mood and Affect: Mood normal.         Behavior: Behavior is cooperative.         Assessment/Plan   Problem List Items Addressed This Visit       HTN (hypertension) - Primary     continue anihypertensives  discontinue hydrochlorothiazide  Labs - hypernatremia    BP running low in afternoon hours    Denies any symptoms of hypotension     Monitor BP         Bipolar depression (Multi)     Mood stable  Depakote  Zoloft  Continue to monitor mood and behaviors         GERD (gastroesophageal reflux disease)     Monitor GI symptoms          Medications, treatments, and labs reviewed  Continue medications and treatments as listed in EMR    Jolanta Rainey, APRN-CNP

## 2024-07-12 ENCOUNTER — NURSING HOME VISIT (OUTPATIENT)
Dept: POST ACUTE CARE | Facility: EXTERNAL LOCATION | Age: 74
End: 2024-07-12
Payer: MEDICAID

## 2024-07-12 DIAGNOSIS — R19.5 LOOSE STOOLS: Primary | ICD-10-CM

## 2024-07-12 DIAGNOSIS — I10 HYPERTENSION, UNSPECIFIED TYPE: ICD-10-CM

## 2024-07-12 DIAGNOSIS — I63.9 CEREBROVASCULAR ACCIDENT (CVA), UNSPECIFIED MECHANISM (MULTI): ICD-10-CM

## 2024-07-12 NOTE — PROGRESS NOTES
PROGRESS NOTE    Subjective   Chief complaint: Hernando Minor is a 73 y.o. male who is a long term care patient being seen and evaluated for loose stools.    HPI:  HPI  Patient is seen due to reports of loose stools.  Nursing held.  Patient's MiraLAX today.  Continue to monitor for worsening symptoms or continued symptoms.  Patient is afebrile.  Patient appears to be in no acute distress.    Objective   Vital signs: 127/73, 55    Physical Exam  Constitutional:       General: He is not in acute distress.     Appearance: He is obese.   Eyes:      Extraocular Movements: Extraocular movements intact.   Cardiovascular:      Rate and Rhythm: Normal rate and regular rhythm.   Pulmonary:      Effort: Pulmonary effort is normal.      Breath sounds: Normal breath sounds.   Abdominal:      General: Bowel sounds are normal.      Palpations: Abdomen is soft.   Musculoskeletal:         General: Normal range of motion.      Cervical back: Normal range of motion and neck supple.      Right lower leg: No edema.      Left lower leg: No edema.   Skin:     General: Skin is warm and dry.   Neurological:      Mental Status: He is alert.   Psychiatric:         Behavior: Behavior is cooperative.         Assessment/Plan   Problem List Items Addressed This Visit       HTN (hypertension)     continue anihypertensives  Monitor BP         CVA (cerebral vascular accident) (Multi)     Monitor for weakness and changes in speech         Loose stools - Primary     MiraLAX held today.  Continue to monitor          Medications, treatments, and labs reviewed  Continue medications and treatments as listed in EMR    Scribe Attestation  Sabrina MARQUES Scribe   attest that this documentation has been prepared under the direction and in the presence of Papo Dupont MD    Provider Attestation - Scribe documentation  All medical record entries made by the Scribe were at my direction and personally dictated by me. I have reviewed the chart and agree that  the record accurately reflects my personal performance of the history, physical exam, discussion and plan.   Papo Dupont MD

## 2024-07-12 NOTE — LETTER
Patient: Hernando Minor  : 1950    Encounter Date: 2024    PROGRESS NOTE    Subjective  Chief complaint: Hernando Minor is a 73 y.o. male who is a long term care patient being seen and evaluated for loose stools.    HPI:  HPI  Patient is seen due to reports of loose stools.  Nursing held.  Patient's MiraLAX today.  Continue to monitor for worsening symptoms or continued symptoms.  Patient is afebrile.  Patient appears to be in no acute distress.    Objective  Vital signs: 127/73, 55    Physical Exam  Constitutional:       General: He is not in acute distress.     Appearance: He is obese.   Eyes:      Extraocular Movements: Extraocular movements intact.   Cardiovascular:      Rate and Rhythm: Normal rate and regular rhythm.   Pulmonary:      Effort: Pulmonary effort is normal.      Breath sounds: Normal breath sounds.   Abdominal:      General: Bowel sounds are normal.      Palpations: Abdomen is soft.   Musculoskeletal:         General: Normal range of motion.      Cervical back: Normal range of motion and neck supple.      Right lower leg: No edema.      Left lower leg: No edema.   Skin:     General: Skin is warm and dry.   Neurological:      Mental Status: He is alert.   Psychiatric:         Behavior: Behavior is cooperative.         Assessment/Plan  Problem List Items Addressed This Visit       HTN (hypertension)     continue anihypertensives  Monitor BP         CVA (cerebral vascular accident) (Multi)     Monitor for weakness and changes in speech         Loose stools - Primary     MiraLAX held today.  Continue to monitor          Medications, treatments, and labs reviewed  Continue medications and treatments as listed in EMR    Scribe Attestation  SHELLI, Anna Garcia   attest that this documentation has been prepared under the direction and in the presence of Papo Dupont MD    Provider Attestation - Scribe documentation  All medical record entries made by the Scribe were at my direction and  personally dictated by me. I have reviewed the chart and agree that the record accurately reflects my personal performance of the history, physical exam, discussion and plan.   Papo Dupont MD            Electronically Signed By: Papo Dupont MD   7/12/24  2:23 PM

## 2024-07-13 NOTE — ASSESSMENT & PLAN NOTE
Monitor GI symptoms  
Mood stable  Depakote  Zoloft  Continue to monitor mood and behaviors  
continue anihypertensives  discontinue hydrochlorothiazide  Labs - hypernatremia    BP running low in afternoon hours    Denies any symptoms of hypotension     Monitor BP  
alert and awake

## 2024-07-18 ENCOUNTER — NURSING HOME VISIT (OUTPATIENT)
Dept: POST ACUTE CARE | Facility: EXTERNAL LOCATION | Age: 74
End: 2024-07-18
Payer: MEDICAID

## 2024-07-18 DIAGNOSIS — F02.811 ALZHEIMER'S DEMENTIA OF OTHER ONSET, WITH AGITATION, UNSPECIFIED DEMENTIA SEVERITY (MULTI): ICD-10-CM

## 2024-07-18 DIAGNOSIS — R53.1 WEAKNESS: ICD-10-CM

## 2024-07-18 DIAGNOSIS — K21.9 GASTROESOPHAGEAL REFLUX DISEASE, UNSPECIFIED WHETHER ESOPHAGITIS PRESENT: ICD-10-CM

## 2024-07-18 DIAGNOSIS — F31.9 BIPOLAR DEPRESSION (MULTI): Primary | ICD-10-CM

## 2024-07-18 DIAGNOSIS — G30.8 ALZHEIMER'S DEMENTIA OF OTHER ONSET, WITH AGITATION, UNSPECIFIED DEMENTIA SEVERITY (MULTI): ICD-10-CM

## 2024-07-18 DIAGNOSIS — I10 HYPERTENSION, UNSPECIFIED TYPE: ICD-10-CM

## 2024-07-18 PROCEDURE — 99309 SBSQ NF CARE MODERATE MDM 30: CPT | Performed by: NURSE PRACTITIONER

## 2024-07-18 NOTE — LETTER
Patient: Hernando Minor  : 1950    Encounter Date: 2024    PROGRESS NOTE    Subjective  Chief complaint: Hernando Minor is a 73 y.o. male who is a long term care patient being seen and evaluated for combative behavior    HPI: Notified by nursing that he was combative towards another resident previous day.   He is currently in bed. Just finished breakfast. Is cooperative. Denies any discomfort. He currently reports no awareness of him being aggressive towards other resident.   The caregivers report increased aggression and resistant with care over past several days. The staff reports that he is easily angered.    Nursing to send urine for C&S. Referral for mental health assessment.   HPI      Objective  Vital signs: 1236/54-72-18-98.3     Physical Exam  Vitals and nursing note reviewed.   Constitutional:       General: He is not in acute distress.     Appearance: He is overweight.      Comments: Self propels in wheelchair    Eyes:      Extraocular Movements: Extraocular movements intact.   Cardiovascular:      Rate and Rhythm: Normal rate and regular rhythm.   Pulmonary:      Effort: Pulmonary effort is normal.      Breath sounds: Normal breath sounds.   Abdominal:      General: Bowel sounds are normal.      Palpations: Abdomen is soft.   Musculoskeletal:      Cervical back: Neck supple.      Right lower leg: No edema.      Left lower leg: No edema.   Neurological:      Mental Status: He is alert.   Psychiatric:         Mood and Affect: Mood normal.         Behavior: Behavior is cooperative.      Comments: Staff reports aggressive behavior and combative towards another resident.   Is cooperative at this time          Assessment/Plan  Problem List Items Addressed This Visit       HTN (hypertension)     continue anihypertensives  Monitor BP         Bipolar depression (Multi) - Primary       Continue antidepressants  Continue to monitor mood and behaviors  Had episode of aggression towards another resident    To check urine - culture and sensitivity  The caregivers reports that he has been more aggressive with care.   Mental health to assess            Weakness     Requires extensive assistance from staff for all HOC and mobility   Self propels in wheelchair          GERD (gastroesophageal reflux disease)     Monitor GI symptoms         Dementia (Multi)     Poor judgement regarding safety and safety of others   Is oriented to name and surroundings    HX combative behavior   Mental health to follow           Medications, treatments, and labs reviewed  Continue medications and treatments as listed in EMR    NIKKI Cruz      Electronically Signed By: NIKKI Cruz   7/20/24  7:37 PM

## 2024-07-19 ENCOUNTER — NURSING HOME VISIT (OUTPATIENT)
Dept: POST ACUTE CARE | Facility: EXTERNAL LOCATION | Age: 74
End: 2024-07-19
Payer: MEDICAID

## 2024-07-19 DIAGNOSIS — R33.8 BENIGN PROSTATIC HYPERPLASIA WITH URINARY RETENTION: ICD-10-CM

## 2024-07-19 DIAGNOSIS — N40.1 BENIGN PROSTATIC HYPERPLASIA WITH URINARY RETENTION: ICD-10-CM

## 2024-07-19 DIAGNOSIS — I10 HYPERTENSION, UNSPECIFIED TYPE: ICD-10-CM

## 2024-07-19 DIAGNOSIS — I63.9 CEREBROVASCULAR ACCIDENT (CVA), UNSPECIFIED MECHANISM (MULTI): ICD-10-CM

## 2024-07-19 DIAGNOSIS — K21.9 GASTROESOPHAGEAL REFLUX DISEASE, UNSPECIFIED WHETHER ESOPHAGITIS PRESENT: ICD-10-CM

## 2024-07-19 DIAGNOSIS — F31.9 BIPOLAR DEPRESSION (MULTI): Primary | ICD-10-CM

## 2024-07-19 PROCEDURE — 99309 SBSQ NF CARE MODERATE MDM 30: CPT | Performed by: INTERNAL MEDICINE

## 2024-07-19 NOTE — PROGRESS NOTES
PROGRESS NOTE    Subjective   Chief complaint: Hernando Minor is a 73 y.o. male who is a long term care patient being seen and evaluated for combative behavior    HPI: Notified by nursing that he was combative towards another resident previous day.   He is currently in bed. Just finished breakfast. Is cooperative. Denies any discomfort. He currently reports no awareness of him being aggressive towards other resident.   The caregivers report increased aggression and resistant with care over past several days. The staff reports that he is easily angered.    Nursing to send urine for C&S. Referral for mental health assessment.   HPI      Objective   Vital signs: 1236/54-72-18-98.3     Physical Exam  Vitals and nursing note reviewed.   Constitutional:       General: He is not in acute distress.     Appearance: He is overweight.      Comments: Self propels in wheelchair    Eyes:      Extraocular Movements: Extraocular movements intact.   Cardiovascular:      Rate and Rhythm: Normal rate and regular rhythm.   Pulmonary:      Effort: Pulmonary effort is normal.      Breath sounds: Normal breath sounds.   Abdominal:      General: Bowel sounds are normal.      Palpations: Abdomen is soft.   Musculoskeletal:      Cervical back: Neck supple.      Right lower leg: No edema.      Left lower leg: No edema.   Neurological:      Mental Status: He is alert.   Psychiatric:         Mood and Affect: Mood normal.         Behavior: Behavior is cooperative.      Comments: Staff reports aggressive behavior and combative towards another resident.   Is cooperative at this time          Assessment/Plan   Problem List Items Addressed This Visit       HTN (hypertension)     continue anihypertensives  Monitor BP         Bipolar depression (Multi) - Primary       Continue antidepressants  Continue to monitor mood and behaviors  Had episode of aggression towards another resident   To check urine - culture and sensitivity  The caregivers reports that  he has been more aggressive with care.   Mental health to assess            Weakness     Requires extensive assistance from staff for all HOC and mobility   Self propels in wheelchair          GERD (gastroesophageal reflux disease)     Monitor GI symptoms         Dementia (Multi)     Poor judgement regarding safety and safety of others   Is oriented to name and surroundings    HX combative behavior   Mental health to follow           Medications, treatments, and labs reviewed  Continue medications and treatments as listed in EMR    Jolanta Rainey, APRN-CNP

## 2024-07-19 NOTE — PROGRESS NOTES
PROGRESS NOTE    Subjective   Chief complaint: Hernando Minor is a 73 y.o. male who is a long term care patient being seen and evaluated for monthly general medical care and follow-up    HPI:  HPI  Patient presents for general medical care and f/u.  Patient seen and examined at bedside.  No issues per nursing.  Patient has no acute complaints. HTN, denies fatigue, sob, and palpitations. . Patient has diagnosis of GERD, denies heartburn, regurgitation, epigastric discomfort, sour taste, and cough.  CVA, denies changes in weakness and speech. BPH, denies difficulty urinating, weak stream or frequency. Patient with diagnosis of bipolar depression, denies feeling down and thoughts of harming self or others. No acute distress.    Objective   Vital signs: Vital signs reviewed in the facility EHR    Physical Exam  Constitutional:       General: He is not in acute distress.     Appearance: He is obese.   Eyes:      Extraocular Movements: Extraocular movements intact.   Cardiovascular:      Rate and Rhythm: Normal rate and regular rhythm.   Pulmonary:      Effort: Pulmonary effort is normal.      Breath sounds: Normal breath sounds.   Abdominal:      General: Bowel sounds are normal.      Palpations: Abdomen is soft.   Musculoskeletal:         General: Normal range of motion.      Cervical back: Normal range of motion and neck supple.      Right lower leg: No edema.      Left lower leg: No edema.   Skin:     General: Skin is warm and dry.   Neurological:      Mental Status: He is alert.   Psychiatric:         Behavior: Behavior is cooperative.         Assessment/Plan   Problem List Items Addressed This Visit       HTN (hypertension)     continue anihypertensives  Monitor BP         Bipolar depression (Multi) - Primary     Mood stable  Continue antidepressants  Continue to monitor mood and behaviors         BPH (benign prostatic hyperplasia)     Stable  Continue Tamsulosin  Monitor for symptoms         CVA (cerebral vascular  accident) (Multi)     Monitor for weakness and changes in speech         GERD (gastroesophageal reflux disease)     Monitor GI symptoms          Medications, treatments, and labs reviewed  Continue medications and treatments as listed in EMR    Scribe Attestation  I, Anna Garcia   attest that this documentation has been prepared under the direction and in the presence of Papo Dupont MD    Provider Attestation - Scribe documentation  All medical record entries made by the Scribe were at my direction and personally dictated by me. I have reviewed the chart and agree that the record accurately reflects my personal performance of the history, physical exam, discussion and plan.   Papo Dupont MD

## 2024-07-19 NOTE — LETTER
Patient: Hernando Minor  : 1950    Encounter Date: 2024    PROGRESS NOTE    Subjective  Chief complaint: Hernando Minor is a 73 y.o. male who is a long term care patient being seen and evaluated for monthly general medical care and follow-up    HPI:  HPI  Patient presents for general medical care and f/u.  Patient seen and examined at bedside.  No issues per nursing.  Patient has no acute complaints. HTN, denies fatigue, sob, and palpitations. . Patient has diagnosis of GERD, denies heartburn, regurgitation, epigastric discomfort, sour taste, and cough.  CVA, denies changes in weakness and speech. BPH, denies difficulty urinating, weak stream or frequency. Patient with diagnosis of bipolar depression, denies feeling down and thoughts of harming self or others. No acute distress.    Objective  Vital signs: Vital signs reviewed in the facility EHR    Physical Exam  Constitutional:       General: He is not in acute distress.     Appearance: He is obese.   Eyes:      Extraocular Movements: Extraocular movements intact.   Cardiovascular:      Rate and Rhythm: Normal rate and regular rhythm.   Pulmonary:      Effort: Pulmonary effort is normal.      Breath sounds: Normal breath sounds.   Abdominal:      General: Bowel sounds are normal.      Palpations: Abdomen is soft.   Musculoskeletal:         General: Normal range of motion.      Cervical back: Normal range of motion and neck supple.      Right lower leg: No edema.      Left lower leg: No edema.   Skin:     General: Skin is warm and dry.   Neurological:      Mental Status: He is alert.   Psychiatric:         Behavior: Behavior is cooperative.         Assessment/Plan  Problem List Items Addressed This Visit       HTN (hypertension)     continue anihypertensives  Monitor BP         Bipolar depression (Multi) - Primary     Mood stable  Continue antidepressants  Continue to monitor mood and behaviors         BPH (benign prostatic hyperplasia)      Stable  Continue Tamsulosin  Monitor for symptoms         CVA (cerebral vascular accident) (Multi)     Monitor for weakness and changes in speech         GERD (gastroesophageal reflux disease)     Monitor GI symptoms          Medications, treatments, and labs reviewed  Continue medications and treatments as listed in EMR    Scribe Attestation  Sabrina MARQUES Scribe   attest that this documentation has been prepared under the direction and in the presence of Papo Dupont MD    Provider Attestation - Scribe documentation  All medical record entries made by the Scribe were at my direction and personally dictated by me. I have reviewed the chart and agree that the record accurately reflects my personal performance of the history, physical exam, discussion and plan.   Papo Dupont MD            Electronically Signed By: Papo Dupont MD   7/19/24  2:20 PM

## 2024-07-20 NOTE — ASSESSMENT & PLAN NOTE
Poor judgement regarding safety and safety of others   Is oriented to name and surroundings    HX combative behavior   Mental health to follow

## 2024-07-20 NOTE — ASSESSMENT & PLAN NOTE
Continue antidepressants  Continue to monitor mood and behaviors  Had episode of aggression towards another resident   To check urine - culture and sensitivity  The caregivers reports that he has been more aggressive with care.   Mental health to assess

## 2024-07-23 ENCOUNTER — NURSING HOME VISIT (OUTPATIENT)
Dept: POST ACUTE CARE | Facility: EXTERNAL LOCATION | Age: 74
End: 2024-07-23
Payer: MEDICAID

## 2024-07-23 DIAGNOSIS — F02.811 ALZHEIMER'S DEMENTIA OF OTHER ONSET, WITH AGITATION, UNSPECIFIED DEMENTIA SEVERITY (MULTI): ICD-10-CM

## 2024-07-23 DIAGNOSIS — K21.9 GASTROESOPHAGEAL REFLUX DISEASE, UNSPECIFIED WHETHER ESOPHAGITIS PRESENT: ICD-10-CM

## 2024-07-23 DIAGNOSIS — R53.1 WEAKNESS: Primary | ICD-10-CM

## 2024-07-23 DIAGNOSIS — I15.1 HYPERTENSION SECONDARY TO OTHER RENAL DISORDERS: ICD-10-CM

## 2024-07-23 DIAGNOSIS — F31.9 BIPOLAR DEPRESSION (MULTI): ICD-10-CM

## 2024-07-23 DIAGNOSIS — G30.8 ALZHEIMER'S DEMENTIA OF OTHER ONSET, WITH AGITATION, UNSPECIFIED DEMENTIA SEVERITY (MULTI): ICD-10-CM

## 2024-07-23 DIAGNOSIS — N40.1 BENIGN PROSTATIC HYPERPLASIA WITH URINARY RETENTION: ICD-10-CM

## 2024-07-23 DIAGNOSIS — R33.8 BENIGN PROSTATIC HYPERPLASIA WITH URINARY RETENTION: ICD-10-CM

## 2024-07-23 DIAGNOSIS — E55.9 VITAMIN D DEFICIENCY: ICD-10-CM

## 2024-07-23 PROCEDURE — 99309 SBSQ NF CARE MODERATE MDM 30: CPT | Performed by: NURSE PRACTITIONER

## 2024-07-23 NOTE — LETTER
Patient: Hernando Minor  : 1950    Encounter Date: 2024    PROGRESS NOTE    Subjective  Chief complaint: Hernando Minor is a 73 y.o. male who is a long term care patient being seen and evaluated for behavior     HPI: Social and talkative. Playing cards with another resident. He reports that he is feeling good. Denies any pain. Nursing reports that he has had no verbal or physical outbursts. Is scheduled to see mental health provider.   HPI      Objective  Vital signs: 117/65-78-18-98.4    Physical Exam  Vitals and nursing note reviewed.   Constitutional:       General: He is not in acute distress.     Appearance: He is well-groomed and overweight.   Eyes:      Extraocular Movements: Extraocular movements intact.   Cardiovascular:      Rate and Rhythm: Normal rate and regular rhythm.   Pulmonary:      Effort: Pulmonary effort is normal.      Breath sounds: Normal breath sounds.      Comments: Lungs clear   Abdominal:      General: Bowel sounds are normal.      Palpations: Abdomen is soft.   Musculoskeletal:      Cervical back: Neck supple.      Right lower leg: No edema.      Left lower leg: No edema.   Neurological:      Mental Status: He is alert.   Psychiatric:         Mood and Affect: Mood normal.         Behavior: Behavior is cooperative.         Cognition and Memory: Cognition is impaired. Memory is impaired.      Comments: Poor judgement          Assessment/Plan  Problem List Items Addressed This Visit       HTN (hypertension)     continue anihypertensives  Monitor BP  BP at goal          Bipolar depression (Multi)       Continue antidepressants  Continue to monitor mood and behaviors  Had episode of aggression towards another resident   To check urine - culture and sensitivity  The caregivers reports that he has been more aggressive with care.   Mental health to assess            BPH (benign prostatic hyperplasia)     Stable  Continue Tamsulosin  Monitor for symptoms         Weakness - Primary      Requires extensive assistance from staff for all HOC and mobility   Self propels in wheelchair          GERD (gastroesophageal reflux disease)     Monitor GI symptoms         Vitamin D deficiency      Vit d supplement  Monitor labs          Dementia (Multi)     Poor judgement regarding safety and safety of others   Is oriented to name and surroundings    HX combative behavior   Mental health to follow           Medications, treatments, and labs reviewed  Continue medications and treatments as listed in EMR    NIKKI Cruz      Electronically Signed By: NIKKI Cruz   7/27/24  7:01 PM

## 2024-07-26 NOTE — PROGRESS NOTES
PROGRESS NOTE    Subjective   Chief complaint: Hernando Minor is a 73 y.o. male who is a long term care patient being seen and evaluated for behavior     HPI: Social and talkative. Playing cards with another resident. He reports that he is feeling good. Denies any pain. Nursing reports that he has had no verbal or physical outbursts. Is scheduled to see mental health provider.   HPI      Objective   Vital signs: 117/65-78-18-98.4    Physical Exam  Vitals and nursing note reviewed.   Constitutional:       General: He is not in acute distress.     Appearance: He is well-groomed and overweight.   Eyes:      Extraocular Movements: Extraocular movements intact.   Cardiovascular:      Rate and Rhythm: Normal rate and regular rhythm.   Pulmonary:      Effort: Pulmonary effort is normal.      Breath sounds: Normal breath sounds.      Comments: Lungs clear   Abdominal:      General: Bowel sounds are normal.      Palpations: Abdomen is soft.   Musculoskeletal:      Cervical back: Neck supple.      Right lower leg: No edema.      Left lower leg: No edema.   Neurological:      Mental Status: He is alert.   Psychiatric:         Mood and Affect: Mood normal.         Behavior: Behavior is cooperative.         Cognition and Memory: Cognition is impaired. Memory is impaired.      Comments: Poor judgement          Assessment/Plan   Problem List Items Addressed This Visit       HTN (hypertension)     continue anihypertensives  Monitor BP  BP at goal          Bipolar depression (Multi)       Continue antidepressants  Continue to monitor mood and behaviors  Had episode of aggression towards another resident   To check urine - culture and sensitivity  The caregivers reports that he has been more aggressive with care.   Mental health to assess            BPH (benign prostatic hyperplasia)     Stable  Continue Tamsulosin  Monitor for symptoms         Weakness - Primary     Requires extensive assistance from staff for all HOC and mobility    Self propels in wheelchair          GERD (gastroesophageal reflux disease)     Monitor GI symptoms         Vitamin D deficiency      Vit d supplement  Monitor labs          Dementia (Multi)     Poor judgement regarding safety and safety of others   Is oriented to name and surroundings    HX combative behavior   Mental health to follow           Medications, treatments, and labs reviewed  Continue medications and treatments as listed in EMR    Jolanta Rainey, APRN-CNP

## 2024-09-05 NOTE — PROGRESS NOTES
[facility]  Outpatient Clinic Note    Patient Name:  Hernando Minor  MRN:  [id number]  :  1950    Referring Provider: No ref. provider found  Date of Service: 2024   Visit type: New patient visit     problem list/Chief complaint:  BPH- Taking Tamsulosin       HISTORY OF PRESENT ILLNESS:  Hernando Minor is a 73 y.o. male is a long term care patient with past medical history of dementia, bipolar depression, GERD, HTN, vitamin D deficiency,  BPH, who presents for initial Urology visit. I performed a detailed review of the medical chart records lab testing and imaging. Patient was referred to Urology for BPH.  Patient is accompanied by his daughter who is providing all his medical history. Patient is A&Ox1, pleasant and cooperative. Patient states patient had a couple episodes of incontinence with no awareness. He also reported needing to use the bathroom but was not able to urinate. He was treated for UTI in August at OSH. He has since been started on Tamsulosin and his symptoms seem to be have improved.    PAST MEDICAL HISTORY:  Past Medical History:   Diagnosis Date    Anxiety     Bipolar 1 disorder (Multi)     BPH (benign prostatic hyperplasia)     Dementia (Multi)     Depression     DM (diabetes mellitus) (Multi)     HTN (hypertension)     OA (ocular albinism) (Multi)     Sleep apnea     Urinary retention     Vitamin D deficiency        PAST SURGICAL HISTORY:  Past Surgical History:   Procedure Laterality Date    HIP ARTHROPLASTY Left        ALLERGIES:  Allergies   Allergen Reactions    Cefixime Other    Dilantin [Phenytoin Sodium Extended] Other    Metformin Other       MEDICATIONS:  No current outpatient medications     SOCIAL HISTORY:  Social History     Tobacco Use    Smoking status: Former     Types: Cigarettes    Smokeless tobacco: Never   Vaping Use    Vaping status: Never Used   Substance Use Topics    Alcohol use: Not Currently    Drug use: Never        FAMILY HISTORY:  Family History    Problem Relation Name Age of Onset    Heart attack Father          REVIEW OF SYSTEMS:  10-pt ROS reviewed and negative except as mentioned above.    Vital signs:  There were no vitals taken for this visit.    PHYSICAL EXAMINATION:  General: Appears comfortable and in no apparent distress.  Head: Normocephalic, atraumatic  Eyes: Non-injected conjunctiva, sclera clear, no proptosis  Lungs: Breathing is easy, non-labored. Normal diaphragmatic movement.  Cardiovascular: no peripheral edema, cyanosis or pallor.   Abdomen: soft, non-distended, non-tender  : Bladder: non tender, not distended  MSK: Ambulatory with assistance, using wheelchair  Skin: No visible rashes or lesions  Neurologic: Alert, oriented to person  Psychiatric: mood and affect appropriate      LABORATORY DATA:    Office Visit on 09/06/2024   Component Date Value    POC Color, Urine 09/06/2024 Yellow     POC Appearance, Urine 09/06/2024 Clear     POC Glucose, Urine 09/06/2024 NEGATIVE     POC Bilirubin, Urine 09/06/2024 NEGATIVE     POC Ketones, Urine 09/06/2024 NEGATIVE     POC Specific Gravity, Ur* 09/06/2024 >=1.030     POC Blood, Urine 09/06/2024 TRACE-Intact (A)     POC PH, Urine 09/06/2024 5.5     POC Protein, Urine 09/06/2024 NEGATIVE     POC Urobilinogen, Urine 09/06/2024 4.0 (A)     Poc Nitrite, Urine 09/06/2024 NEGATIVE     POC Leukocytes, Urine 09/06/2024 MODERATE (2+) (A)        ASSESSMENT:  Hernando Minor is a 73 y.o. male with dementia, bipolar depression, BPH, who presents for initial Urology visit.    PLAN:  -UA with moderate leukocytes and trace blood. Urine culture and sensitivity ordered to rule out infection. Will call patient's daughter with results  -PVR 0 ml  -Discussed the risks and benefit of continuing Tamsulosin, medication is working well for patient with no side effects. Discussed other management options. The patient's daughter and I agreed to continue with medication.  -Follow up in 6 months or sooner if needed    All  questions and concerns were addressed. Patient verbalizes understanding and has no other questions at this time.     E&M visit today is associated with current or anticipated ongoing medical care services related to a patient's single, serious condition or a complex condition.    GABBI Miller-CNP  [facility]  9/6/2024

## 2024-09-06 ENCOUNTER — OFFICE VISIT (OUTPATIENT)
Dept: UROLOGY | Facility: HOSPITAL | Age: 74
End: 2024-09-06
Payer: MEDICAID

## 2024-09-06 DIAGNOSIS — R39.9 LOWER URINARY TRACT SYMPTOMS (LUTS): ICD-10-CM

## 2024-09-06 DIAGNOSIS — N40.0 BENIGN PROSTATIC HYPERPLASIA WITHOUT LOWER URINARY TRACT SYMPTOMS: Primary | ICD-10-CM

## 2024-09-06 PROBLEM — M25.552 LEFT HIP PAIN: Status: ACTIVE | Noted: 2024-09-06

## 2024-09-06 LAB
POC APPEARANCE, URINE: CLEAR
POC BILIRUBIN, URINE: NEGATIVE
POC BLOOD, URINE: ABNORMAL
POC COLOR, URINE: YELLOW
POC GLUCOSE, URINE: NEGATIVE MG/DL
POC KETONES, URINE: NEGATIVE MG/DL
POC LEUKOCYTES, URINE: ABNORMAL
POC NITRITE,URINE: NEGATIVE
POC PH, URINE: 5.5 PH
POC PROTEIN, URINE: NEGATIVE MG/DL
POC SPECIFIC GRAVITY, URINE: >=1.03
POC UROBILINOGEN, URINE: 4 EU/DL

## 2024-09-06 PROCEDURE — 51798 US URINE CAPACITY MEASURE: CPT | Performed by: NURSE PRACTITIONER

## 2024-09-06 PROCEDURE — 99214 OFFICE O/P EST MOD 30 MIN: CPT | Performed by: NURSE PRACTITIONER

## 2024-09-06 PROCEDURE — 1160F RVW MEDS BY RX/DR IN RCRD: CPT | Performed by: NURSE PRACTITIONER

## 2024-09-06 PROCEDURE — 4010F ACE/ARB THERAPY RXD/TAKEN: CPT | Performed by: NURSE PRACTITIONER

## 2024-09-06 PROCEDURE — 99204 OFFICE O/P NEW MOD 45 MIN: CPT | Performed by: NURSE PRACTITIONER

## 2024-09-06 PROCEDURE — 1159F MED LIST DOCD IN RCRD: CPT | Performed by: NURSE PRACTITIONER

## 2024-09-06 PROCEDURE — 81003 URINALYSIS AUTO W/O SCOPE: CPT | Performed by: NURSE PRACTITIONER

## 2024-09-06 PROCEDURE — 1036F TOBACCO NON-USER: CPT | Performed by: NURSE PRACTITIONER

## 2024-09-06 RX ORDER — TRAZODONE HYDROCHLORIDE 50 MG/1
50 TABLET ORAL NIGHTLY
COMMUNITY
Start: 2024-09-02

## 2024-09-06 RX ORDER — BUSPIRONE HYDROCHLORIDE 10 MG/1
10 TABLET ORAL 2 TIMES DAILY
COMMUNITY

## 2024-09-06 RX ORDER — SERTRALINE HYDROCHLORIDE 50 MG/1
50 TABLET, FILM COATED ORAL
COMMUNITY

## 2024-09-06 RX ORDER — LANOLIN ALCOHOL/MO/W.PET/CERES
400 CREAM (GRAM) TOPICAL DAILY
COMMUNITY

## 2024-09-06 RX ORDER — HYDROCHLOROTHIAZIDE 25 MG/1
25 TABLET ORAL
COMMUNITY

## 2024-09-06 RX ORDER — DIVALPROEX SODIUM 250 MG/1
250 TABLET, DELAYED RELEASE ORAL 2 TIMES DAILY
COMMUNITY

## 2024-09-06 RX ORDER — METOPROLOL TARTRATE 25 MG/1
25 TABLET, FILM COATED ORAL 2 TIMES DAILY
COMMUNITY

## 2024-09-06 RX ORDER — NAPROXEN 500 MG/1
500 TABLET ORAL 2 TIMES DAILY
COMMUNITY
Start: 2021-03-12

## 2024-09-06 RX ORDER — POTASSIUM CHLORIDE 750 MG/1
10 TABLET, EXTENDED RELEASE ORAL DAILY
COMMUNITY
Start: 2024-09-04

## 2024-09-06 RX ORDER — TAMSULOSIN HYDROCHLORIDE 0.4 MG/1
1 CAPSULE ORAL EVERY 24 HOURS
COMMUNITY

## 2024-09-06 RX ORDER — ASPIRIN 325 MG
50000 TABLET, DELAYED RELEASE (ENTERIC COATED) ORAL WEEKLY
COMMUNITY
Start: 2023-12-31

## 2024-09-06 RX ORDER — LISINOPRIL 20 MG/1
20 TABLET ORAL
COMMUNITY

## 2024-09-07 PROCEDURE — 87086 URINE CULTURE/COLONY COUNT: CPT | Performed by: NURSE PRACTITIONER

## 2024-09-08 LAB — BACTERIA UR CULT: ABNORMAL

## 2024-09-09 LAB — BACTERIA UR CULT: ABNORMAL

## 2024-09-12 ENCOUNTER — DOCUMENTATION (OUTPATIENT)
Dept: UROLOGY | Facility: HOSPITAL | Age: 74
End: 2024-09-12
Payer: MEDICAID

## 2024-09-12 DIAGNOSIS — N39.0 LOWER URINARY TRACT INFECTIOUS DISEASE: Primary | ICD-10-CM

## 2024-09-12 NOTE — PROGRESS NOTES
Error. Unable to send antibiotic due to no pharmacy on file.    GABBI Miller-CNP  [facility]  9/12/2024 11:42 AM

## 2024-09-12 NOTE — PROGRESS NOTES
Patient's urine culture positive for UTI with E.Coli that is multidrug resistant, sensitivity not available. Plan to treat with sensitivity pending. Attempted to call patient at [phone] with no answer, unable to leave VM. Called patient's daughter Elizabet at [phone] with no answer,  left with instructions for her to call our office, contact info provided. Also, called patient's spouse Pau at [phone] with no answer,  left with contact information provided for her to call our office.  Unable to send antibiotic due to no pharmacy listed on [facility].    Cassidy Clark, GABBI-CNP  [facility]  9/12/2024 11:40 AM

## 2024-09-27 ENCOUNTER — TELEPHONE (OUTPATIENT)
Dept: UROLOGY | Facility: HOSPITAL | Age: 74
End: 2024-09-27
Payer: MEDICAID

## 2024-09-27 NOTE — TELEPHONE ENCOUNTER
Received message that patient's daughter was calling back to discuss urine culture results. Attempted to call patient's daughter back at [phone] with no answer. VM left with contact information for patient's daughter to our office call back.    GABBI Miller-CNP  [facility]  9/27/2024 2:45 PM

## 2024-10-14 NOTE — PROGRESS NOTES
[facility]  Outpatient Clinic Note    Patient Name:  Hernando Minor  MRN:  [id number]  :  1950  Date of Service: 10/15/2024     Visit type: Follow up visit    HPI    Interval History:  Hernando Minor is a 74 y.o. male who is being seen today for  problems listed below.     Problem list/Chief complaints:  BPH- Taking Tamsulosin   UTI - >100,000 Escherichia coli Abnormal  urine culture 24: NPV. Patient was referred to Urology for BPH. Patient is accompanied by his daughter who is providing all his medical history. Patient is A&Ox1, pleasant and cooperative. Patient states patient had a couple episodes of incontinence with no awareness. He also reported needing to use the bathroom but was not able to urinate. He was treated for UTI in August at OSH. He has since been started on Tamsulosin and his symptoms seem to be have improved. Urine culture positive for UTI, unable to reach patient or his daughter to prescribe antibiotics as no pharmacy is listed in chart and patient lives at NH facility.     10/15/24: Patient presents for follow up, he is accompanied by his daughter who is providing all medical history. Patient denies dysuria, no urgency or frequency, no hematuria, no fever or chills.       Past Medical History:   Diagnosis Date    Anxiety     Bipolar 1 disorder (Multi)     BPH (benign prostatic hyperplasia)     Dementia     Depression     DM (diabetes mellitus) (Multi)     HTN (hypertension)     OA (ocular albinism) (Multi)     Sleep apnea     Urinary retention     Vitamin D deficiency        Past Surgical History:   Procedure Laterality Date    HIP ARTHROPLASTY Left        Social History     Socioeconomic History    Marital status:      Spouse name: Not on file    Number of children: Not on file    Years of education: Not on file    Highest education level: Not on file   Occupational History    Not on file   Tobacco Use    Smoking status: Former     Types: Cigarettes     Smokeless tobacco: Never   Vaping Use    Vaping status: Never Used   Substance and Sexual Activity    Alcohol use: Not Currently    Drug use: Never    Sexual activity: Not Currently   Other Topics Concern    Not on file   Social History Narrative    Not on file     Social Determinants of Health     Financial Resource Strain: Not on file   Food Insecurity: Not on file   Transportation Needs: Not on file   Physical Activity: Not on file   Stress: Not on file   Social Connections: Not on file   Intimate Partner Violence: Not on file   Housing Stability: Not on file       Allergies   Allergen Reactions    Cefixime Other    Dilantin [Phenytoin Sodium Extended] Other    Metformin Other          Current Outpatient Medications:     busPIRone (Buspar) 10 mg tablet, Take 1 tablet (10 mg) by mouth twice a day., Disp: , Rfl:     cholecalciferol (Vitamin D-3) 50,000 unit capsule, Take 1 capsule (50,000 Units) by mouth 1 (one) time per week., Disp: , Rfl:     divalproex (Depakote) 250 mg EC tablet, Take 1 tablet (250 mg) by mouth twice a day., Disp: , Rfl:     hydroCHLOROthiazide (HYDRODiuril) 25 mg tablet, Take 1 tablet (25 mg) by mouth once daily., Disp: , Rfl:     lisinopril 20 mg tablet, Take 1 tablet (20 mg) by mouth once daily., Disp: , Rfl:     magnesium oxide (Mag-Ox) 400 mg (241.3 mg magnesium) tablet, Take 1 tablet (400 mg) by mouth once daily., Disp: , Rfl:     metoprolol tartrate (Lopressor) 25 mg tablet, Take 1 tablet (25 mg) by mouth twice a day., Disp: , Rfl:     naproxen (Naprosyn) 500 mg tablet, Take 1 tablet (500 mg) by mouth twice a day., Disp: , Rfl:     potassium chloride CR 10 mEq ER tablet, Take 1 tablet (10 mEq) by mouth once daily., Disp: , Rfl:     sertraline (Zoloft) 50 mg tablet, Take 1 tablet (50 mg) by mouth once daily., Disp: , Rfl:     tamsulosin (Flomax) 0.4 mg 24 hr capsule, Take 1 capsule (0.4 mg) by mouth once every 24 hours., Disp: , Rfl:     traZODone (Desyrel) 50 mg tablet, Take 1 tablet (50  mg) by mouth once daily at bedtime., Disp: , Rfl:      Review of system:  All other systems have been reviewed and are negative for complaints      Last recorded vitals:  There were no vitals taken for this visit.    Physical Exam:  General: Appears comfortable and in no apparent distress.  Head: Normocephalic, atraumatic  Eyes: Non-injected conjunctiva, sclera clear, no proptosis  Lungs: Breathing is easy, non-labored. Speaking in clear and complete sentences. Normal diaphragmatic movement.  Cardiovascular: no peripheral edema, cyanosis or pallor.   Abdomen: soft, non-distended, non-tender  : Bladder: non tender, not distended  MSK: Using wheelchair  Skin: No visible rashes or lesions  Neurologic: Alert  Psychiatric: mood and affect appropriate      Labs  Office Visit on 09/06/2024   Component Date Value    POC Color, Urine 09/06/2024 Yellow     POC Appearance, Urine 09/06/2024 Clear     POC Glucose, Urine 09/06/2024 NEGATIVE     POC Bilirubin, Urine 09/06/2024 NEGATIVE     POC Ketones, Urine 09/06/2024 NEGATIVE     POC Specific Gravity, Ur* 09/06/2024 >=1.030     POC Blood, Urine 09/06/2024 TRACE-Intact (A)     POC PH, Urine 09/06/2024 5.5     POC Protein, Urine 09/06/2024 NEGATIVE     POC Urobilinogen, Urine 09/06/2024 4.0 (A)     Poc Nitrite, Urine 09/06/2024 NEGATIVE     POC Leukocytes, Urine 09/06/2024 MODERATE (2+) (A)     Urine Culture 09/07/2024 >100,000 Escherichia coli (A)        Assessment and Plan:  Hernando Minor is a 74 y.o. male with history of dementia, bipolar depression, GERD, HTN, vitamin D deficiency, BPH, UTI, who presents for follow up.       Plan:  -Patient unable to urinate during clinic visit.  -Order placed for UA to be completed at NH facility, NH to treat for UTI as clinically indicated.  -Pharmacy that NH uses is [facility], Pharmacy fax number [phone]  -Follow-up as needed    All questions and concerns were addressed. Patient verbalizes understanding and has no other  questions at this time.     Some elements copied from my note on 9/6/24, the elements have been updated and all reflect current decision making from today, 10/15/24    E&M visit today is associated with current or anticipated ongoing medical care services related to a patient's single, serious condition or a complex condition.    GABBI Miller-CNP   [facility]  10/15/24 11:03 AM

## 2024-10-15 ENCOUNTER — OFFICE VISIT (OUTPATIENT)
Dept: UROLOGY | Facility: HOSPITAL | Age: 74
End: 2024-10-15
Payer: MEDICAID

## 2024-10-15 DIAGNOSIS — N40.1 BENIGN PROSTATIC HYPERPLASIA WITH URINARY RETENTION: Primary | ICD-10-CM

## 2024-10-15 DIAGNOSIS — N30.00 ACUTE CYSTITIS WITHOUT HEMATURIA: ICD-10-CM

## 2024-10-15 DIAGNOSIS — N39.42 URINARY INCONTINENCE WITHOUT SENSORY AWARENESS: ICD-10-CM

## 2024-10-15 DIAGNOSIS — R33.8 BENIGN PROSTATIC HYPERPLASIA WITH URINARY RETENTION: Primary | ICD-10-CM

## 2024-10-15 LAB — BACTERIA UR CULT: ABNORMAL

## 2024-10-15 PROCEDURE — 1159F MED LIST DOCD IN RCRD: CPT | Performed by: NURSE PRACTITIONER

## 2024-10-15 PROCEDURE — 1160F RVW MEDS BY RX/DR IN RCRD: CPT | Performed by: NURSE PRACTITIONER

## 2024-10-15 PROCEDURE — 99213 OFFICE O/P EST LOW 20 MIN: CPT | Performed by: NURSE PRACTITIONER

## 2024-10-15 PROCEDURE — 1036F TOBACCO NON-USER: CPT | Performed by: NURSE PRACTITIONER

## 2024-10-15 NOTE — LETTER
October 15, 2024     Patient:    Date of Birth:    Date of Visit:        To Whom It May Concern:    Hernando Minor was seen in my clinic on 10/15/2024 at 10:00 am. Please excuse Hernando for his absence from work on this day to make the appointment.    If you have any questions or concerns, please don't hesitate to call.         Sincerely,         Cassidy Clark, APRN-CNP        CC: No Recipients